# Patient Record
Sex: MALE | Race: WHITE | Employment: FULL TIME | URBAN - METROPOLITAN AREA
[De-identification: names, ages, dates, MRNs, and addresses within clinical notes are randomized per-mention and may not be internally consistent; named-entity substitution may affect disease eponyms.]

---

## 2023-10-04 ENCOUNTER — OFFICE VISIT (OUTPATIENT)
Dept: PRIMARY CARE | Facility: CLINIC | Age: 43
End: 2023-10-04
Payer: COMMERCIAL

## 2023-10-04 VITALS
DIASTOLIC BLOOD PRESSURE: 79 MMHG | HEIGHT: 68 IN | HEART RATE: 68 BPM | BODY MASS INDEX: 34.71 KG/M2 | TEMPERATURE: 98 F | SYSTOLIC BLOOD PRESSURE: 119 MMHG | WEIGHT: 229 LBS

## 2023-10-04 DIAGNOSIS — M75.21 BICEPS TENDONITIS ON RIGHT: ICD-10-CM

## 2023-10-04 DIAGNOSIS — M25.511 ACUTE PAIN OF RIGHT SHOULDER: Primary | ICD-10-CM

## 2023-10-04 DIAGNOSIS — S46.001S ROTATOR CUFF INJURY, RIGHT, SEQUELA: ICD-10-CM

## 2023-10-04 PROCEDURE — 99213 OFFICE O/P EST LOW 20 MIN: CPT | Performed by: FAMILY MEDICINE

## 2023-10-04 RX ORDER — PREDNISONE 20 MG/1
TABLET ORAL
Qty: 18 TABLET | Refills: 0 | Status: SHIPPED | OUTPATIENT
Start: 2023-10-04 | End: 2023-11-27 | Stop reason: WASHOUT

## 2023-10-04 RX ORDER — TIZANIDINE 2 MG/1
2 TABLET ORAL ONCE
Status: DISCONTINUED | OUTPATIENT
Start: 2023-10-04 | End: 2023-11-27

## 2023-10-04 RX ORDER — LEVOTHYROXINE SODIUM 150 UG/1
TABLET ORAL
COMMUNITY
End: 2024-03-08 | Stop reason: SDUPTHER

## 2023-10-04 ASSESSMENT — PAIN SCALES - GENERAL: PAINLEVEL: 4

## 2023-10-04 ASSESSMENT — PATIENT HEALTH QUESTIONNAIRE - PHQ9
1. LITTLE INTEREST OR PLEASURE IN DOING THINGS: NOT AT ALL
SUM OF ALL RESPONSES TO PHQ9 QUESTIONS 1 AND 2: 0
2. FEELING DOWN, DEPRESSED OR HOPELESS: NOT AT ALL

## 2023-10-04 NOTE — PROGRESS NOTES
Patient is here 2 months of worsening shoulder discomfort and pain.  Patient states he has had no injury and many years ago he did have a similar issue with his shoulder and went to physical therapy which was helpful.  He is right-handed and has not had any physical trauma or injury to the shoulder.  He has been waking up and feeling like it is getting frozen and it takes a little bit while for the shoulder mobility to increase especially in abduction and abduction patient has been taking Aleve but feeling like its not working is good.  There is no neurological symptoms or signs.  No weakness in his hand or  strength no injury to his neck or neck pain.    REVIEW OF SYSTEMS: 12 systems negative except for those mentioned in the HPI.    PHYSICAL EXAMINATION:   Constitutional: The patient is alert, in no acute  distress.  Eyes: Extraocular movements are intact.   ENT: external ear canals patent  Neck: no  JVD.  Heart: no JVD  Lungs: Normal respiration without stridor or nasal flaring   Psychiatric: Good judgment and insight. Normal affect and mood.  Skin: no rashes or lesions  .MSK/neurologic: Cranials 2-12 grossly intact.  5/5 motor upper extremities  Full range of motion of the right shoulder.  Negative empty can sign.  Negative Neer and Apley scratch.  Patient does have discomfort in the biceps tendon, supraspinatus insertion, and AC joint.      Assessment:  per EMR    Plan: Discussed with the patient oral medication first steroid muscle relaxers as well as physical therapy.  Also discussed range of motion patient is not having sleep disturbance at this time and will defer injection until possibly follow-up.  He had no trauma and no other physical necessity for an x-ray at this time but if he does follow-up I would consider injections x-ray at that time for possible spurring.  No other red flags or neurologic signs.  Patient is and agrees we will follow-up in 3 weeks      This dictation was created using Dragon  dictation and may contain errors

## 2023-10-24 PROBLEM — Z86.39 HISTORY OF GRAVES' DISEASE: Status: ACTIVE | Noted: 2022-04-14

## 2023-10-24 PROBLEM — E05.00 GRAVES DISEASE: Status: ACTIVE | Noted: 2023-10-24

## 2023-10-24 PROBLEM — D69.6 THROMBOCYTOPENIA (CMS-HCC): Status: ACTIVE | Noted: 2023-10-24

## 2023-10-24 PROBLEM — E66.9 OBESITY, CLASS II, BMI 35-39.9: Status: ACTIVE | Noted: 2021-10-14

## 2023-10-24 PROBLEM — R79.89 INCREASED THYROID STIMULATING HORMONE LEVEL: Status: ACTIVE | Noted: 2023-10-24

## 2023-10-24 PROBLEM — K21.9 GERD (GASTROESOPHAGEAL REFLUX DISEASE): Status: ACTIVE | Noted: 2023-10-24

## 2023-10-24 PROBLEM — E66.812 OBESITY, CLASS II, BMI 35-39.9: Status: ACTIVE | Noted: 2021-10-14

## 2023-10-24 PROBLEM — E78.5 HYPERLIPIDEMIA: Status: ACTIVE | Noted: 2023-10-24

## 2023-10-24 PROBLEM — E03.9 HYPOTHYROIDISM: Status: ACTIVE | Noted: 2020-03-30

## 2023-10-24 PROBLEM — E66.9 OBESITY (BMI 30-39.9): Status: ACTIVE | Noted: 2023-10-24

## 2023-10-24 PROBLEM — G47.30 SLEEP APNEA: Status: ACTIVE | Noted: 2023-10-24

## 2023-10-24 PROBLEM — E55.9 VITAMIN D DEFICIENCY: Status: ACTIVE | Noted: 2023-10-24

## 2023-10-24 PROBLEM — L30.9 ECZEMA: Status: ACTIVE | Noted: 2023-10-24

## 2023-10-24 RX ORDER — VIT C/E/ZN/COPPR/LUTEIN/ZEAXAN 250MG-90MG
25 CAPSULE ORAL DAILY
COMMUNITY

## 2023-10-26 ENCOUNTER — APPOINTMENT (OUTPATIENT)
Dept: PRIMARY CARE | Facility: CLINIC | Age: 43
End: 2023-10-26
Payer: COMMERCIAL

## 2023-11-27 ENCOUNTER — OFFICE VISIT (OUTPATIENT)
Dept: PRIMARY CARE | Facility: CLINIC | Age: 43
End: 2023-11-27
Payer: COMMERCIAL

## 2023-11-27 VITALS
TEMPERATURE: 98 F | DIASTOLIC BLOOD PRESSURE: 78 MMHG | WEIGHT: 232 LBS | HEART RATE: 75 BPM | HEIGHT: 68 IN | OXYGEN SATURATION: 99 % | SYSTOLIC BLOOD PRESSURE: 110 MMHG | BODY MASS INDEX: 35.16 KG/M2

## 2023-11-27 DIAGNOSIS — E66.9 OBESITY (BMI 30-39.9): ICD-10-CM

## 2023-11-27 DIAGNOSIS — S33.5XXA LUMBAR SPRAIN, INITIAL ENCOUNTER: Primary | ICD-10-CM

## 2023-11-27 DIAGNOSIS — Z71.3 WEIGHT LOSS COUNSELING, ENCOUNTER FOR: ICD-10-CM

## 2023-11-27 DIAGNOSIS — E03.9 ACQUIRED HYPOTHYROIDISM: ICD-10-CM

## 2023-11-27 PROCEDURE — 99214 OFFICE O/P EST MOD 30 MIN: CPT | Performed by: FAMILY MEDICINE

## 2023-11-27 PROCEDURE — G0447 BEHAVIOR COUNSEL OBESITY 15M: HCPCS | Performed by: FAMILY MEDICINE

## 2023-11-27 RX ORDER — PHENTERMINE HYDROCHLORIDE 37.5 MG/1
37.5 TABLET ORAL
Qty: 14 TABLET | Refills: 0 | Status: SHIPPED | OUTPATIENT
Start: 2023-11-27 | End: 2023-12-11 | Stop reason: SDUPTHER

## 2023-11-27 RX ORDER — NAPROXEN 250 MG/1
250 TABLET ORAL
COMMUNITY

## 2023-11-27 RX ORDER — TIZANIDINE 2 MG/1
2 TABLET ORAL EVERY 6 HOURS PRN
Qty: 90 TABLET | Refills: 0 | Status: SHIPPED | OUTPATIENT
Start: 2023-11-27 | End: 2023-12-11 | Stop reason: WASHOUT

## 2023-11-27 RX ORDER — MELOXICAM 15 MG/1
15 TABLET ORAL DAILY
Qty: 30 TABLET | Refills: 2 | Status: SHIPPED | OUTPATIENT
Start: 2023-11-27 | End: 2023-12-11 | Stop reason: WASHOUT

## 2023-11-27 ASSESSMENT — PATIENT HEALTH QUESTIONNAIRE - PHQ9
2. FEELING DOWN, DEPRESSED OR HOPELESS: NOT AT ALL
SUM OF ALL RESPONSES TO PHQ9 QUESTIONS 1 AND 2: 0
1. LITTLE INTEREST OR PLEASURE IN DOING THINGS: NOT AT ALL

## 2023-11-27 ASSESSMENT — PAIN SCALES - GENERAL: PAINLEVEL: 2

## 2023-11-27 NOTE — PROGRESS NOTES
Patient is here 3 follow-up.  Is completely resolved with the shoulder pain however now he is having some low back discomfort.  He does work mainly especially on the laptop and is found himself bending forward a lot.  When he does bend forward he does have the discomfort.  He also wakes up every morning with aches and pains and has been taking Aleve.  Once he gets up and moves around and he does that he does actually feel much better at that point but he does not want to keep taking medications every day to facilitate that.  No radiation into his legs or feet.  It is localized to the right lower back.    REVIEW OF SYSTEMS: 12 systems negative except for those mentioned in the HPI.    PHYSICAL EXAMINATION:   Constitutional: The patient is alert, in no acute  distress.  Eyes: Extraocular movements are intact.   ENT: external ear canals patent  Neck: no  JVD.  Heart: no JVD  Lungs: Normal respiration without stridor or nasal flaring   Psychiatric: Good judgment and insight. Normal affect and mood.  Skin: no rashes or lesions  MSK/neurologic: Cranial nerves II through XII grossly intact.  2/4 DTRs ankle and knee.  Negative straight leg raise bilaterally.  Sensation grossly intact.  Point tenderness to the insertion of the right quadratus lumborum.  Poor anterior posturing of the spine as well.      Assessment:  per EMR    Plan:  Discussed home stretching and strengthening exercises with the patient.  Will switch over to daily anti-inflammatory muscle relaxers.  Also discussed switching over to a phone bed.  OARRS reviewed appropriate.  Patient is also interested in medical weight loss has had a hard time getting his thyroid under control.  He does do 5000 steps almost daily and does track this.  He does not know how many calories and 1 pound of fat.  I discussed 1800 tin when he is currently doing 7 pounds per calendar month.  Patient be started on Adipex OARRS is reviewed.  Patient will follow-up in 2 weeks for this.   Also discussed the home exercises and strengthening medications for the back.  Answered all questions shoulder has resolved    This dictation was created using Dragon dictation and may contain errors

## 2023-12-11 ENCOUNTER — OFFICE VISIT (OUTPATIENT)
Dept: PRIMARY CARE | Facility: CLINIC | Age: 43
End: 2023-12-11
Payer: COMMERCIAL

## 2023-12-11 VITALS
OXYGEN SATURATION: 96 % | SYSTOLIC BLOOD PRESSURE: 100 MMHG | WEIGHT: 224 LBS | HEART RATE: 73 BPM | HEIGHT: 68 IN | DIASTOLIC BLOOD PRESSURE: 62 MMHG | TEMPERATURE: 98 F | BODY MASS INDEX: 33.95 KG/M2

## 2023-12-11 DIAGNOSIS — Z71.3 WEIGHT LOSS COUNSELING, ENCOUNTER FOR: Primary | ICD-10-CM

## 2023-12-11 DIAGNOSIS — E66.9 OBESITY (BMI 30-39.9): ICD-10-CM

## 2023-12-11 DIAGNOSIS — S33.5XXD LUMBAR SPRAIN, SUBSEQUENT ENCOUNTER: ICD-10-CM

## 2023-12-11 PROBLEM — E66.812 OBESITY, CLASS II, BMI 35-39.9: Status: RESOLVED | Noted: 2021-10-14 | Resolved: 2023-12-11

## 2023-12-11 PROCEDURE — 99213 OFFICE O/P EST LOW 20 MIN: CPT | Performed by: FAMILY MEDICINE

## 2023-12-11 RX ORDER — PREDNISONE 50 MG/1
50 TABLET ORAL DAILY
Qty: 5 TABLET | Refills: 0 | Status: SHIPPED | OUTPATIENT
Start: 2023-12-11 | End: 2023-12-16

## 2023-12-11 RX ORDER — PHENTERMINE HYDROCHLORIDE 37.5 MG/1
37.5 TABLET ORAL
Qty: 30 TABLET | Refills: 0 | Status: SHIPPED | OUTPATIENT
Start: 2023-12-11 | End: 2024-01-12 | Stop reason: SDUPTHER

## 2023-12-11 ASSESSMENT — PAIN SCALES - GENERAL: PAINLEVEL: 1

## 2023-12-11 NOTE — PROGRESS NOTES
Patient is here to follow-up of medical weight loss as well as also low back pain.  He has made some ergonomic changes at work and that is definitely alleviated the discomfort during the day however he still wakes up stiff.  Once he gets up and starts moving around it is not as bad the muscle laxer's on the medication is not really done much for that.  He did get a Tempur-Pedic mattress Topper to see if that would help.    REVIEW OF SYSTEMS: 12 systems negative except for those mentioned in the HPI.    PHYSICAL EXAMINATION:   Constitutional: The patient is alert, in no acute  distress.  Eyes: Extraocular movements are intact.   ENT: external ear canals patent  Neck: no  JVD.  Heart: no JVD  Lungs: Normal respiration without stridor or nasal flaring   Psychiatric: Good judgment and insight. Normal affect and mood.  Skin: no rashes or lesions    Assessment:  per EMR    Plan:  OARRS reviewed and appropriate.  Patient is done with medical weight loss and is down 8 pounds.  Will continue with.  I discussed he may actually need a much firmer mattress and this is likely the issue waking up in the morning and is otherwise he should be waking up pain-free especially if he has made ergonomic changes.  I offered the patient go to physical therapy patient declines will make some further adjustments to see if he needs to get from her bed as well and will follow-up in 1 month    This dictation was created using Dragon dictation and may contain errors

## 2024-01-11 RX ORDER — MELOXICAM 7.5 MG/1
7.5 TABLET ORAL DAILY
COMMUNITY
Start: 2023-12-29 | End: 2024-01-12 | Stop reason: WASHOUT

## 2024-01-12 ENCOUNTER — OFFICE VISIT (OUTPATIENT)
Dept: PRIMARY CARE | Facility: CLINIC | Age: 44
End: 2024-01-12
Payer: COMMERCIAL

## 2024-01-12 VITALS
OXYGEN SATURATION: 99 % | BODY MASS INDEX: 32.74 KG/M2 | HEIGHT: 68 IN | WEIGHT: 216 LBS | TEMPERATURE: 98 F | DIASTOLIC BLOOD PRESSURE: 66 MMHG | SYSTOLIC BLOOD PRESSURE: 122 MMHG | HEART RATE: 72 BPM
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
## 2024-01-12 DIAGNOSIS — Z71.3 WEIGHT LOSS COUNSELING, ENCOUNTER FOR: Primary | ICD-10-CM

## 2024-01-12 DIAGNOSIS — E03.9 ACQUIRED HYPOTHYROIDISM: ICD-10-CM

## 2024-01-12 DIAGNOSIS — E66.9 OBESITY (BMI 30-39.9): ICD-10-CM
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
## 2024-01-12 PROCEDURE — 99214 OFFICE O/P EST MOD 30 MIN: CPT | Performed by: FAMILY MEDICINE

## 2024-01-12 RX ORDER — PHENTERMINE HYDROCHLORIDE 37.5 MG/1
37.5 TABLET ORAL
Qty: 30 TABLET | Refills: 0 | Status: SHIPPED | OUTPATIENT
Start: 2024-01-12 | End: 2024-02-09 | Stop reason: SDUPTHER

## 2024-01-12 RX ORDER — DICLOFENAC SODIUM 75 MG/1
75 TABLET, DELAYED RELEASE ORAL 2 TIMES DAILY PRN
Qty: 60 TABLET | Refills: 0 | Status: SHIPPED | OUTPATIENT
Start: 2024-01-12 | End: 2024-03-12

## 2024-01-12 ASSESSMENT — PATIENT HEALTH QUESTIONNAIRE - PHQ9
SUM OF ALL RESPONSES TO PHQ9 QUESTIONS 1 AND 2: 0
2. FEELING DOWN, DEPRESSED OR HOPELESS: NOT AT ALL
1. LITTLE INTEREST OR PLEASURE IN DOING THINGS: NOT AT ALL

## 2024-01-12 NOTE — PROGRESS NOTES
Patient is here for follow-up of medical weight loss.  His mastiff cane Shanice dog bit his hand and I had to go to the ER was placed on amoxicillin.  He is having some discomfort and pain in the hand.  I said no redness but she is simply sore from the actual bite itself.  He is doing excellent with the weight loss.    REVIEW OF SYSTEMS: 12 systems negative except for those mentioned in the HPI.    PHYSICAL EXAMINATION:   Constitutional: The patient is alert, in no acute  distress.  Eyes: Extraocular movements are intact.   ENT: external ear canals patent  Neck: no  JVD.  Heart: no JVD  Lungs: Normal respiration without stridor or nasal flaring   Psychiatric: Good judgment and insight. Normal affect and mood.  Skin: Healed puncture wounds on both the dorsum and palmar aspect of the right hand over the first second metacarpal area with well-healed no erythema slightly edematous    Assessment:  per EMR    Plan:  OARRS reviewed appropriate.  Patient is done excellent continue on medical weight loss.  Also add diclofenac to the hand.  Still amoxicillin no signs of infection    This dictation was created using Dragon dictation and may contain errors

## 2024-02-08 PROBLEM — E66.9 OBESITY: Status: ACTIVE | Noted: 2024-02-08

## 2024-02-08 PROBLEM — M25.511 PAIN OF RIGHT SHOULDER REGION: Status: ACTIVE | Noted: 2023-10-04

## 2024-02-08 PROBLEM — E66.9 OBESITY WITH BODY MASS INDEX 30 OR GREATER: Status: ACTIVE | Noted: 2023-10-24

## 2024-02-08 PROBLEM — D69.6 LOW PLATELET COUNT (CMS-HCC): Status: ACTIVE | Noted: 2023-10-24

## 2024-02-08 PROBLEM — R79.89 HIGH THYROID HORMONE LEVEL: Status: ACTIVE | Noted: 2023-10-24

## 2024-02-08 PROBLEM — M75.20 BICEPS TENDINITIS: Status: ACTIVE | Noted: 2023-10-04

## 2024-02-08 PROBLEM — S46.001A INJURY OF RIGHT ROTATOR CUFF: Status: ACTIVE | Noted: 2023-10-04

## 2024-02-08 PROBLEM — E05.00 GRAVES' DISEASE: Status: ACTIVE | Noted: 2020-03-30

## 2024-02-08 PROBLEM — K21.9 GASTROESOPHAGEAL REFLUX DISEASE: Status: ACTIVE | Noted: 2023-10-24

## 2024-02-09 ENCOUNTER — OFFICE VISIT (OUTPATIENT)
Dept: PRIMARY CARE | Facility: CLINIC | Age: 44
End: 2024-02-09
Payer: COMMERCIAL

## 2024-02-09 VITALS
OXYGEN SATURATION: 97 % | TEMPERATURE: 98 F | HEIGHT: 68 IN | SYSTOLIC BLOOD PRESSURE: 132 MMHG | DIASTOLIC BLOOD PRESSURE: 90 MMHG | WEIGHT: 215 LBS | HEART RATE: 71 BPM | BODY MASS INDEX: 32.58 KG/M2

## 2024-02-09 DIAGNOSIS — E66.9 OBESITY (BMI 30-39.9): ICD-10-CM

## 2024-02-09 DIAGNOSIS — S33.5XXD LUMBAR SPRAIN, SUBSEQUENT ENCOUNTER: ICD-10-CM

## 2024-02-09 DIAGNOSIS — Z71.3 WEIGHT LOSS COUNSELING, ENCOUNTER FOR: Primary | ICD-10-CM

## 2024-02-09 PROCEDURE — 97110 THERAPEUTIC EXERCISES: CPT | Performed by: FAMILY MEDICINE

## 2024-02-09 PROCEDURE — 99214 OFFICE O/P EST MOD 30 MIN: CPT | Performed by: FAMILY MEDICINE

## 2024-02-09 RX ORDER — PHENTERMINE HYDROCHLORIDE 37.5 MG/1
37.5 TABLET ORAL
Qty: 30 TABLET | Refills: 0 | Status: SHIPPED | OUTPATIENT
Start: 2024-02-09 | End: 2024-03-08 | Stop reason: SDUPTHER

## 2024-02-09 RX ORDER — METHOCARBAMOL 500 MG/1
500 TABLET, FILM COATED ORAL 4 TIMES DAILY PRN
Qty: 90 TABLET | Refills: 0 | Status: SHIPPED | OUTPATIENT
Start: 2024-02-09 | End: 2024-04-09

## 2024-02-09 NOTE — PROGRESS NOTES
Patient is here 1 month follow-up of medical weight loss.  Is done well overall and is his general 5% jaspreet but is only down 1 pound this month.  The bite to his hand is completely resolved.  Still having some back spasms.  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  No radiation no loss of bowel or bladder function or loss of function.    REVIEW OF SYSTEMS: 12 systems negative except for those mentioned in the HPI.    PHYSICAL EXAMINATION:   Constitutional: The patient is alert, in no acute  distress.  Eyes: Extraocular movements are intact.   ENT: external ear canals patent  Neck: no  JVD.  Heart: no JVD  Lungs: Normal respiration without stridor or nasal flaring   Psychiatric: Good judgment and insight. Normal affect and mood.  Skin: no rashes or lesions  MSK/neurologic: Cranials 2-12 is intact.  Negative straight leg raise bilaterally.  2/4 DTRs ankle and knee.      Assessment:  per EMR    Plan:  OARRS reviewed actually does not show up in OARRS which is in usual we will continue with the Adipex.  Also give muscle laxer for the back.  Musculoskeletal and also printed off the [place] back method.  Should the patient in the office had to do these and did actually have good relief with doing some the exercises here in the office today.  If not improving would also consider back x-ray will follow-up in 1 month needs to lose 6 pounds    This dictation was created using Dragon dictation and may contain errors

## 2024-02-29 ENCOUNTER — OFFICE VISIT (OUTPATIENT)
Dept: UROLOGY | Facility: HOSPITAL | Age: 44
End: 2024-02-29
Payer: COMMERCIAL

## 2024-02-29 ENCOUNTER — APPOINTMENT (OUTPATIENT)
Dept: UROLOGY | Facility: CLINIC | Age: 44
End: 2024-02-29
Payer: COMMERCIAL

## 2024-02-29 DIAGNOSIS — N20.0 KIDNEY STONES: ICD-10-CM

## 2024-02-29 DIAGNOSIS — N20.1 RIGHT URETERAL STONE: ICD-10-CM

## 2024-02-29 DIAGNOSIS — N20.1 RIGHT URETERAL STONE: Primary | ICD-10-CM

## 2024-02-29 PROCEDURE — 82365 CALCULUS SPECTROSCOPY: CPT | Performed by: UROLOGY

## 2024-02-29 PROCEDURE — 99214 OFFICE O/P EST MOD 30 MIN: CPT | Performed by: UROLOGY

## 2024-02-29 PROCEDURE — 99204 OFFICE O/P NEW MOD 45 MIN: CPT | Performed by: UROLOGY

## 2024-02-29 NOTE — PROGRESS NOTES
HPI:    Kedar Moore is a 43 y.o. male referred by ?ED or Dr. Manjit Torres for ?. Hx of GERD, HLD, sleep apnea.     Review of Systems:  All systems reviewed. Anything negative noted in the HPI.    Physical Exam:  Vitals signs reviewed.  Constitutional:      Appearance: Well-developed.  HENT:     Head: Normocephalic and atraumatic.  Neck:     Musculoskeletal: Normal range of motion.  Pulmonary:     Effort: Pulmonary effort is normal.  Musculoskeletal: Normal range of motion.  Skin:     General: Skin is warm and dry.  Neurological:     Mental Status: Alert and oriented to person, place, and time.  Psychiatric:        Behavior: Behavior normal.        Thought Content: Thought content normal.        Judgment: Judgment normal.    Procedures:    Assessment/Plan   Kedar Moore is a 43 y.o. male referred by ?ED or Dr. Manjit Torres for ?. Hx of GERD, HLD, sleep apnea.             By signing my name below, I, Radha Hansen, attest that this documentation has been prepared under the direction and in the presence of Dr. Villa George.  All medical record entries made by the Scribe were at my direction and personally dictated by me. I have reviewed the chart and agree that the record accurately reflects my personal performance of the history, physical exam, discussion and plan.

## 2024-02-29 NOTE — PROGRESS NOTES
"HPI    43-year-old is seen for kidney stones.    Was in ED 2/27/24 with R flank pain. CT showed a 1.2mm R UVJ stone. Bladder empty. Mild R hydro. Small nonobstructing R kidney stone.     He passed a stone yesterday.  Brings in for analysis.  Previous stone past year prior was calcium oxalate.  His for stone was 1 year ago , he passed spontaneously.    No results found for: \"PSA\"      Current Medications:  Current Outpatient Medications   Medication Sig Dispense Refill    cholecalciferol (Vitamin D3) 25 MCG (1000 UT) capsule Take 1 capsule (25 mcg) by mouth once daily.      diclofenac (Voltaren) 75 mg EC tablet Take 1 tablet (75 mg) by mouth 2 times a day as needed (pain). Do not crush, chew, or split. 60 tablet 0    methocarbamol (Robaxin) 500 mg tablet Take 1 tablet (500 mg) by mouth 4 times a day as needed for muscle spasms. 90 tablet 0    naproxen (Naprosyn) 250 mg tablet Take 1 tablet (250 mg) by mouth 2 times a day with meals. PRN      phentermine (Adipex-P) 37.5 mg tablet Take 1 tablet (37.5 mg) by mouth once daily in the morning. Take before meals. 30 tablet 0    Synthroid 150 mcg tablet take one-full tablet from monday thru saturday and take one and one-half tablets on sunday.       No current facility-administered medications for this visit.        Active Problems:  Kedar Moore is a 43 y.o. male with the following Problems and Medications.  Patient Active Problem List   Diagnosis    Acute pain of right shoulder    Rotator cuff injury, right, sequela    Biceps tendonitis on right    Eczema    GERD (gastroesophageal reflux disease)    Graves disease    History of Graves' disease    Hyperlipidemia    Hypothyroidism    Increased thyroid stimulating hormone level    Obesity (BMI 30-39.9)    Sleep apnea    Thrombocytopenia (CMS/HCC)    Vitamin D deficiency    Lumbar sprain    Weight loss counseling, encounter for    Dog bite    Pain of right shoulder region    Biceps tendinitis    Gastroesophageal reflux " disease    Graves' disease    High thyroid hormone level    Injury of right rotator cuff    Low platelet count (CMS/HCC)     Current Outpatient Medications   Medication Sig Dispense Refill    cholecalciferol (Vitamin D3) 25 MCG (1000 UT) capsule Take 1 capsule (25 mcg) by mouth once daily.      diclofenac (Voltaren) 75 mg EC tablet Take 1 tablet (75 mg) by mouth 2 times a day as needed (pain). Do not crush, chew, or split. 60 tablet 0    methocarbamol (Robaxin) 500 mg tablet Take 1 tablet (500 mg) by mouth 4 times a day as needed for muscle spasms. 90 tablet 0    naproxen (Naprosyn) 250 mg tablet Take 1 tablet (250 mg) by mouth 2 times a day with meals. PRN      phentermine (Adipex-P) 37.5 mg tablet Take 1 tablet (37.5 mg) by mouth once daily in the morning. Take before meals. 30 tablet 0    Synthroid 150 mcg tablet take one-full tablet from monday thru saturday and take one and one-half tablets on sunday.       No current facility-administered medications for this visit.       PMH:  No past medical history on file.    PSH:  Past Surgical History:   Procedure Laterality Date    OTHER SURGICAL HISTORY  05/18/2022    Sinus surgery    OTHER SURGICAL HISTORY  05/18/2022    Complete colonoscopy       FMH:  Family History   Problem Relation Name Age of Onset    Thyroid disease Mother      Diverticulosis Father      Heart block Father         SHx:  Social History     Tobacco Use    Smoking status: Never    Smokeless tobacco: Current     Types: Chew   Vaping Use    Vaping Use: Never used   Substance Use Topics    Alcohol use: Not Currently    Drug use: Never       Allergies:  Allergies   Allergen Reactions    Pollen Extracts Unknown         Assessment/Plan  Recurrent kidney stones, now having passed a small right ureteral stone.  Will send for stone analysis.  We discussed metabolic and surgical management of stones.  He has a small stone in his right kidney, would not recommend treatment at this time.  We discussed  metabolic management.  He is keen to find out why he is forming stones.  Will obtain a metabolic urine test and see back in 3 months to review the results.        Scribe Attestation  By signing my name below, I, Radha Hernández, attest that this documentation  has been prepared under the direction and in the presence of Jordy Yo MD.

## 2024-03-05 LAB
APPEARANCE STONE: NORMAL
COMPN STONE: NORMAL
SPECIMEN WT: 5 MG

## 2024-03-08 ENCOUNTER — OFFICE VISIT (OUTPATIENT)
Dept: PRIMARY CARE | Facility: CLINIC | Age: 44
End: 2024-03-08
Payer: COMMERCIAL

## 2024-03-08 VITALS
BODY MASS INDEX: 31.83 KG/M2 | WEIGHT: 210 LBS | TEMPERATURE: 98.2 F | HEART RATE: 84 BPM | OXYGEN SATURATION: 97 % | SYSTOLIC BLOOD PRESSURE: 121 MMHG | HEIGHT: 68 IN | DIASTOLIC BLOOD PRESSURE: 87 MMHG

## 2024-03-08 DIAGNOSIS — N20.0 CALCIUM KIDNEY STONE: ICD-10-CM

## 2024-03-08 DIAGNOSIS — E55.9 VITAMIN D DEFICIENCY: ICD-10-CM

## 2024-03-08 DIAGNOSIS — E78.2 MIXED HYPERLIPIDEMIA: ICD-10-CM

## 2024-03-08 DIAGNOSIS — Z00.00 WELLNESS EXAMINATION: ICD-10-CM

## 2024-03-08 DIAGNOSIS — E66.9 OBESITY (BMI 30-39.9): ICD-10-CM

## 2024-03-08 DIAGNOSIS — E03.9 ACQUIRED HYPOTHYROIDISM: ICD-10-CM

## 2024-03-08 DIAGNOSIS — Z71.3 WEIGHT LOSS COUNSELING, ENCOUNTER FOR: Primary | ICD-10-CM

## 2024-03-08 PROCEDURE — 99214 OFFICE O/P EST MOD 30 MIN: CPT | Performed by: FAMILY MEDICINE

## 2024-03-08 RX ORDER — PHENTERMINE HYDROCHLORIDE 37.5 MG/1
37.5 TABLET ORAL
Qty: 30 TABLET | Refills: 0 | Status: SHIPPED | OUTPATIENT
Start: 2024-03-08 | End: 2024-04-04 | Stop reason: SDUPTHER

## 2024-03-08 RX ORDER — LEVOTHYROXINE SODIUM 150 UG/1
150 TABLET ORAL
Qty: 90 TABLET | Refills: 1 | Status: SHIPPED | OUTPATIENT
Start: 2024-03-08 | End: 2024-05-06

## 2024-03-08 NOTE — PROGRESS NOTES
Patient is here follow-up medical weight loss as well as a couple other things.  He had a hard time getting into see an endocrinologist to get a refill of his thyroid and also have medication check.  He also passed a kidney stone follow-up with his urologist would like some help with what he should do.    REVIEW OF SYSTEMS: 12 systems negative except for those mentioned in the HPI.    PHYSICAL EXAMINATION:   Constitutional: The patient is alert, in no acute  distress.  Eyes: Extraocular movements are intact.   ENT: external ear canals patent  Neck: no  JVD.  Heart: no JVD  Lungs: Normal respiration without stridor or nasal flaring   Psychiatric: Good judgment and insight. Normal affect and mood.  Skin: no rashes or lesions    Assessment:  per EMR    Plan:  OARRS reviewed appropriate.  Patient is more than 5% x 3 pounds.  He needs to be at 205 or less for next month to continue on the Adipex.  Also reviewed his chart and the prior kidney stones.  Gave him a printout of things to eat and drink and also things to avoid for kidney stones and follow-up with urology.  Will also take over thyroid management.  He also needs for annual blood work CBC CMP A1c lipid panel thyroid refill his medication follow-up in 1 month    This dictation was created using Dragon dictation and may contain errors

## 2024-03-16 ENCOUNTER — LAB (OUTPATIENT)
Dept: LAB | Facility: LAB | Age: 44
End: 2024-03-16
Payer: COMMERCIAL

## 2024-03-16 DIAGNOSIS — E03.9 ACQUIRED HYPOTHYROIDISM: ICD-10-CM

## 2024-03-16 DIAGNOSIS — E78.2 MIXED HYPERLIPIDEMIA: ICD-10-CM

## 2024-03-16 DIAGNOSIS — Z00.00 WELLNESS EXAMINATION: ICD-10-CM

## 2024-03-16 DIAGNOSIS — E55.9 VITAMIN D DEFICIENCY: ICD-10-CM

## 2024-03-16 PROCEDURE — 84443 ASSAY THYROID STIM HORMONE: CPT

## 2024-03-16 PROCEDURE — 36415 COLL VENOUS BLD VENIPUNCTURE: CPT

## 2024-03-16 PROCEDURE — 80061 LIPID PANEL: CPT

## 2024-03-16 PROCEDURE — 85025 COMPLETE CBC W/AUTO DIFF WBC: CPT

## 2024-03-16 PROCEDURE — 82306 VITAMIN D 25 HYDROXY: CPT

## 2024-03-16 PROCEDURE — 83036 HEMOGLOBIN GLYCOSYLATED A1C: CPT

## 2024-03-16 PROCEDURE — 80053 COMPREHEN METABOLIC PANEL: CPT

## 2024-03-17 LAB
25(OH)D3 SERPL-MCNC: 22 NG/ML (ref 30–100)
ALBUMIN SERPL BCP-MCNC: 4.3 G/DL (ref 3.4–5)
ALP SERPL-CCNC: 65 U/L (ref 33–120)
ALT SERPL W P-5'-P-CCNC: 11 U/L (ref 10–52)
ANION GAP SERPL CALC-SCNC: 15 MMOL/L (ref 10–20)
AST SERPL W P-5'-P-CCNC: 11 U/L (ref 9–39)
BASOPHILS # BLD AUTO: 0.05 X10*3/UL (ref 0–0.1)
BASOPHILS NFR BLD AUTO: 0.7 %
BILIRUB SERPL-MCNC: 0.4 MG/DL (ref 0–1.2)
BUN SERPL-MCNC: 16 MG/DL (ref 6–23)
CALCIUM SERPL-MCNC: 9 MG/DL (ref 8.6–10.6)
CHLORIDE SERPL-SCNC: 104 MMOL/L (ref 98–107)
CHOLEST SERPL-MCNC: 163 MG/DL (ref 0–199)
CHOLESTEROL/HDL RATIO: 3.2
CO2 SERPL-SCNC: 26 MMOL/L (ref 21–32)
CREAT SERPL-MCNC: 1.12 MG/DL (ref 0.5–1.3)
EGFRCR SERPLBLD CKD-EPI 2021: 83 ML/MIN/1.73M*2
EOSINOPHIL # BLD AUTO: 0.14 X10*3/UL (ref 0–0.7)
EOSINOPHIL NFR BLD AUTO: 1.9 %
ERYTHROCYTE [DISTWIDTH] IN BLOOD BY AUTOMATED COUNT: 13.1 % (ref 11.5–14.5)
EST. AVERAGE GLUCOSE BLD GHB EST-MCNC: 103 MG/DL
GLUCOSE SERPL-MCNC: 101 MG/DL (ref 74–99)
HBA1C MFR BLD: 5.2 %
HCT VFR BLD AUTO: 41.7 % (ref 41–52)
HDLC SERPL-MCNC: 51.6 MG/DL
HGB BLD-MCNC: 13.2 G/DL (ref 13.5–17.5)
IMM GRANULOCYTES # BLD AUTO: 0.01 X10*3/UL (ref 0–0.7)
IMM GRANULOCYTES NFR BLD AUTO: 0.1 % (ref 0–0.9)
LDLC SERPL CALC-MCNC: 92 MG/DL
LYMPHOCYTES # BLD AUTO: 1.8 X10*3/UL (ref 1.2–4.8)
LYMPHOCYTES NFR BLD AUTO: 24.8 %
MCH RBC QN AUTO: 27.9 PG (ref 26–34)
MCHC RBC AUTO-ENTMCNC: 31.7 G/DL (ref 32–36)
MCV RBC AUTO: 88 FL (ref 80–100)
MONOCYTES # BLD AUTO: 0.5 X10*3/UL (ref 0.1–1)
MONOCYTES NFR BLD AUTO: 6.9 %
NEUTROPHILS # BLD AUTO: 4.75 X10*3/UL (ref 1.2–7.7)
NEUTROPHILS NFR BLD AUTO: 65.6 %
NON HDL CHOLESTEROL: 111 MG/DL (ref 0–149)
NRBC BLD-RTO: 0 /100 WBCS (ref 0–0)
PLATELET # BLD AUTO: 162 X10*3/UL (ref 150–450)
POTASSIUM SERPL-SCNC: 4 MMOL/L (ref 3.5–5.3)
PROT SERPL-MCNC: 7.5 G/DL (ref 6.4–8.2)
RBC # BLD AUTO: 4.73 X10*6/UL (ref 4.5–5.9)
SODIUM SERPL-SCNC: 141 MMOL/L (ref 136–145)
TRIGL SERPL-MCNC: 99 MG/DL (ref 0–149)
TSH SERPL-ACNC: 29.91 MIU/L (ref 0.44–3.98)
VLDL: 20 MG/DL (ref 0–40)
WBC # BLD AUTO: 7.3 X10*3/UL (ref 4.4–11.3)

## 2024-03-18 ENCOUNTER — TELEPHONE (OUTPATIENT)
Dept: PRIMARY CARE | Facility: CLINIC | Age: 44
End: 2024-03-18
Payer: COMMERCIAL

## 2024-03-18 NOTE — TELEPHONE ENCOUNTER
----- Message from Manjit Torres DO sent at 3/18/2024  9:59 AM EDT -----  Patient's blood work shows that his thyroid is extremely low.  He also has a slightly low blood count we will be talking about this at his follow-up appointment.  We have substantial weight loss he might build to recheck the thyroid but if not may need to be placed on some thyroid medication.

## 2024-04-03 PROBLEM — N20.1 CALCULUS OF URETER: Status: ACTIVE | Noted: 2024-03-08

## 2024-04-04 ENCOUNTER — OFFICE VISIT (OUTPATIENT)
Dept: PRIMARY CARE | Facility: CLINIC | Age: 44
End: 2024-04-04
Payer: COMMERCIAL

## 2024-04-04 VITALS
WEIGHT: 205 LBS | HEART RATE: 73 BPM | SYSTOLIC BLOOD PRESSURE: 130 MMHG | OXYGEN SATURATION: 97 % | DIASTOLIC BLOOD PRESSURE: 92 MMHG | TEMPERATURE: 97.9 F | BODY MASS INDEX: 31.07 KG/M2 | HEIGHT: 68 IN

## 2024-04-04 DIAGNOSIS — E66.9 OBESITY (BMI 30-39.9): ICD-10-CM

## 2024-04-04 DIAGNOSIS — Z71.3 WEIGHT LOSS COUNSELING, ENCOUNTER FOR: ICD-10-CM

## 2024-04-04 DIAGNOSIS — E03.9 ACQUIRED HYPOTHYROIDISM: Primary | ICD-10-CM

## 2024-04-04 PROCEDURE — 99214 OFFICE O/P EST MOD 30 MIN: CPT | Performed by: FAMILY MEDICINE

## 2024-04-04 RX ORDER — PHENTERMINE HYDROCHLORIDE 37.5 MG/1
37.5 TABLET ORAL
Qty: 30 TABLET | Refills: 0 | Status: SHIPPED | OUTPATIENT
Start: 2024-04-04 | End: 2024-05-09 | Stop reason: SDUPTHER

## 2024-04-04 ASSESSMENT — PATIENT HEALTH QUESTIONNAIRE - PHQ9
1. LITTLE INTEREST OR PLEASURE IN DOING THINGS: NOT AT ALL
2. FEELING DOWN, DEPRESSED OR HOPELESS: NOT AT ALL
SUM OF ALL RESPONSES TO PHQ9 QUESTIONS 1 AND 2: 0

## 2024-04-04 NOTE — PROGRESS NOTES
Patient is here 1 month follow-up medical weight loss as well as also his thyroid.  He has not been able to do the refill through the endocrinologist and so he gone back on the 150 when I saw him a month ago and had the labs.  It has always been kind of a problem with the grades were would go up and down and sometimes he would almost come off medication completely.    REVIEW OF SYSTEMS: 12 systems negative except for those mentioned in the HPI.    PHYSICAL EXAMINATION:   Constitutional: The patient is alert, in no acute  distress.  Eyes: Extraocular movements are intact.   ENT: external ear canals patent  Neck: no  JVD.  Heart: no JVD  Lungs: Normal respiration without stridor or nasal flaring   Psychiatric: Good judgment and insight. Normal affect and mood.  Skin: no rashes or lesions    Assessment:  per EMR    Plan:  OARRS reviewed appropriate.  Patient is done excellent with the weight.  TSH was extremely low.  Will retest TSH and antithrombin and TPO antibodies    This dictation was created using Dragon dictation and may contain errors

## 2024-05-03 ENCOUNTER — LAB (OUTPATIENT)
Dept: LAB | Facility: LAB | Age: 44
End: 2024-05-03
Payer: COMMERCIAL

## 2024-05-03 DIAGNOSIS — E03.9 ACQUIRED HYPOTHYROIDISM: ICD-10-CM

## 2024-05-03 PROCEDURE — 36415 COLL VENOUS BLD VENIPUNCTURE: CPT

## 2024-05-03 PROCEDURE — 84443 ASSAY THYROID STIM HORMONE: CPT

## 2024-05-03 PROCEDURE — 86376 MICROSOMAL ANTIBODY EACH: CPT

## 2024-05-03 PROCEDURE — 86800 THYROGLOBULIN ANTIBODY: CPT

## 2024-05-03 PROCEDURE — 84439 ASSAY OF FREE THYROXINE: CPT

## 2024-05-04 LAB
T4 FREE SERPL-MCNC: 0.76 NG/DL (ref 0.78–1.48)
THYROPEROXIDASE AB SERPL-ACNC: >1000 IU/ML
TSH SERPL-ACNC: 31.2 MIU/L (ref 0.44–3.98)

## 2024-05-06 DIAGNOSIS — Z00.00 WELLNESS EXAMINATION: ICD-10-CM

## 2024-05-06 DIAGNOSIS — E78.2 MIXED HYPERLIPIDEMIA: ICD-10-CM

## 2024-05-06 DIAGNOSIS — E55.9 VITAMIN D DEFICIENCY: ICD-10-CM

## 2024-05-06 DIAGNOSIS — E03.9 ACQUIRED HYPOTHYROIDISM: ICD-10-CM

## 2024-05-06 RX ORDER — LEVOTHYROXINE SODIUM 200 UG/1
200 TABLET ORAL DAILY
Qty: 30 TABLET | Refills: 11 | Status: SHIPPED | OUTPATIENT
Start: 2024-05-06 | End: 2024-06-10 | Stop reason: WASHOUT

## 2024-05-07 LAB — THYROGLOB AB SERPL-ACNC: <0.9 IU/ML (ref 0–4)

## 2024-05-09 ENCOUNTER — OFFICE VISIT (OUTPATIENT)
Dept: PRIMARY CARE | Facility: CLINIC | Age: 44
End: 2024-05-09
Payer: COMMERCIAL

## 2024-05-09 VITALS
SYSTOLIC BLOOD PRESSURE: 106 MMHG | HEIGHT: 68 IN | HEART RATE: 70 BPM | TEMPERATURE: 97.3 F | BODY MASS INDEX: 30.46 KG/M2 | DIASTOLIC BLOOD PRESSURE: 70 MMHG | WEIGHT: 201 LBS

## 2024-05-09 DIAGNOSIS — E03.9 ACQUIRED HYPOTHYROIDISM: ICD-10-CM

## 2024-05-09 DIAGNOSIS — E05.00 GRAVES DISEASE: Primary | ICD-10-CM

## 2024-05-09 DIAGNOSIS — Z71.3 WEIGHT LOSS COUNSELING, ENCOUNTER FOR: ICD-10-CM

## 2024-05-09 PROCEDURE — 99214 OFFICE O/P EST MOD 30 MIN: CPT | Performed by: FAMILY MEDICINE

## 2024-05-09 RX ORDER — LIOTHYRONINE SODIUM 5 UG/1
5 TABLET ORAL DAILY
Qty: 90 TABLET | Refills: 0 | Status: SHIPPED | OUTPATIENT
Start: 2024-05-09 | End: 2024-08-07

## 2024-05-09 RX ORDER — PHENTERMINE HYDROCHLORIDE 37.5 MG/1
37.5 TABLET ORAL
Qty: 30 TABLET | Refills: 0 | Status: SHIPPED | OUTPATIENT
Start: 2024-05-09 | End: 2024-06-10 | Stop reason: SDUPTHER

## 2024-05-09 ASSESSMENT — PATIENT HEALTH QUESTIONNAIRE - PHQ9
SUM OF ALL RESPONSES TO PHQ9 QUESTIONS 1 AND 2: 0
1. LITTLE INTEREST OR PLEASURE IN DOING THINGS: NOT AT ALL
2. FEELING DOWN, DEPRESSED OR HOPELESS: NOT AT ALL

## 2024-05-09 NOTE — PROGRESS NOTES
Patient is here 1 month follow-up of medical weight loss.  He is done well and has met the 5%.  He also wants to talk about the thyroid is always had issues for years about the thyroid going up and down.  Has been switched between levothyroxine and Synthroid does not necessarily seem to make much of a difference sometimes.    REVIEW OF SYSTEMS: 12 systems negative except for those mentioned in the HPI.    PHYSICAL EXAMINATION:   Constitutional: The patient is alert, in no acute  distress.  Eyes: Extraocular movements are intact.   ENT: external ear canals patent  Neck: no  JVD.  Heart: no JVD  Lungs: Normal respiration without stridor or nasal flaring   Psychiatric: Good judgment and insight. Normal affect and mood.  Skin: no rashes or lesions    Assessment:  per EMR    Plan:  OARRS reviewed appropriate.  Will continue the Adipex he is already met 5% he will need to be down to 199 next month to continue 5% weight loss as well.  With the hypothyroid discussed the pathophysiology of this and his TSH still being extremely high.  It does not sound like he is necessarily been on NP thyroid and his body really simply needs more T3 so we will simply give the additional T3 he has not yet started the 200 mcg of Synthroid either we will go ahead and put in for a TSH to be done before his next visit and consider switching him over to NP thyroid if this is not doing well since it has a higher concentration of active form    This dictation was created using Dragon dictation and may contain errors

## 2024-05-30 ENCOUNTER — TELEMEDICINE (OUTPATIENT)
Dept: UROLOGY | Facility: HOSPITAL | Age: 44
End: 2024-05-30
Payer: COMMERCIAL

## 2024-05-30 DIAGNOSIS — N20.0 CALCIUM KIDNEY STONE: Primary | ICD-10-CM

## 2024-05-30 DIAGNOSIS — R82.991 HYPOCITRATURIA: ICD-10-CM

## 2024-05-30 PROCEDURE — 99214 OFFICE O/P EST MOD 30 MIN: CPT | Performed by: UROLOGY

## 2024-05-30 NOTE — PROGRESS NOTES
HPI    44 y.o. male being seen with the following problem list:    Problem list:  Stones    Was in ED 2/27/24 with R flank pain. CT showed a 1.2mm R UVJ stone. Bladder empty. Mild R hydro. Small nonobstructing R kidney stone.      He passed a stone yesterday.  Brings in for analysis.  Previous stone past year prior was calcium oxalate.  His for stone was 1 year ago , he passed spontaneously.    Stone composition - ca ox    Litholink 3/2024 - notable for low urine volumes, hypocitraturia, pH 6.2, elevated dietary sodium.    05/30/24 - seen back to review LL.    Current Medications:  Current Outpatient Medications   Medication Sig Dispense Refill    cholecalciferol (Vitamin D3) 25 MCG (1000 UT) capsule Take 1 capsule (25 mcg) by mouth once daily.      liothyronine (Cytomel) 5 mcg tablet Take 1 tablet (5 mcg) by mouth once daily. 90 tablet 0    methocarbamol (Robaxin) 500 mg tablet Take 1 tablet (500 mg) by mouth 4 times a day as needed for muscle spasms. 90 tablet 0    naproxen (Naprosyn) 250 mg tablet Take 1 tablet (250 mg) by mouth 2 times a day with meals. PRN      phentermine (Adipex-P) 37.5 mg tablet Take 1 tablet (37.5 mg) by mouth once daily in the morning. Take before meals. 30 tablet 0    Synthroid 200 mcg tablet Take 1 tablet (200 mcg) by mouth once daily. 30 tablet 11     No current facility-administered medications for this visit.        Active Problems:  Kedar Moore is a 44 y.o. male with the following Problems and Medications.  Patient Active Problem List   Diagnosis    Acute pain of right shoulder    Rotator cuff injury, right, sequela    Biceps tendonitis on right    Eczema    GERD (gastroesophageal reflux disease)    Graves disease    History of Graves' disease    Hyperlipidemia    Hypothyroidism    Increased thyroid stimulating hormone level    Obesity (BMI 30-39.9)    Sleep apnea    Thrombocytopenia (CMS-HCC)    Vitamin D deficiency    Lumbar sprain    Weight loss counseling, encounter for     Dog bite    Pain of right shoulder region    Biceps tendinitis    Gastroesophageal reflux disease    Graves' disease    High thyroid hormone level    Injury of right rotator cuff    Low platelet count (CMS-HCC)    Calcium kidney stone    Calculus of ureter     Current Outpatient Medications   Medication Sig Dispense Refill    cholecalciferol (Vitamin D3) 25 MCG (1000 UT) capsule Take 1 capsule (25 mcg) by mouth once daily.      liothyronine (Cytomel) 5 mcg tablet Take 1 tablet (5 mcg) by mouth once daily. 90 tablet 0    methocarbamol (Robaxin) 500 mg tablet Take 1 tablet (500 mg) by mouth 4 times a day as needed for muscle spasms. 90 tablet 0    naproxen (Naprosyn) 250 mg tablet Take 1 tablet (250 mg) by mouth 2 times a day with meals. PRN      phentermine (Adipex-P) 37.5 mg tablet Take 1 tablet (37.5 mg) by mouth once daily in the morning. Take before meals. 30 tablet 0    Synthroid 200 mcg tablet Take 1 tablet (200 mcg) by mouth once daily. 30 tablet 11     No current facility-administered medications for this visit.       PMH:  No past medical history on file.    PSH:  Past Surgical History:   Procedure Laterality Date    OTHER SURGICAL HISTORY  05/18/2022    Sinus surgery    OTHER SURGICAL HISTORY  05/18/2022    Complete colonoscopy       FMH:  Family History   Problem Relation Name Age of Onset    Thyroid disease Mother      Diverticulosis Father      Heart block Father         SHx:  Social History     Tobacco Use    Smoking status: Never    Smokeless tobacco: Current     Types: Chew   Vaping Use    Vaping status: Never Used   Substance Use Topics    Alcohol use: Not Currently    Drug use: Never       Allergies:  Allergies   Allergen Reactions    Pollen Extracts Unknown     Assessment/Plan    Recommendations:  Increase fluids, goal >2.5L UOP/day  Litholyte 1 packet 2x a day to increase citrate  Reduce dietary sodium  Fu 6 mo over THV with RBUS prior      Scribe Attestation  By signing my name below, Maribeth SMITH  Radha Hernandez, attest that this documentation  has been prepared under the direction and in the presence of Jordy Yo MD.

## 2024-06-05 ENCOUNTER — LAB (OUTPATIENT)
Dept: LAB | Facility: LAB | Age: 44
End: 2024-06-05
Payer: COMMERCIAL

## 2024-06-05 DIAGNOSIS — E05.00 GRAVES DISEASE: ICD-10-CM

## 2024-06-05 DIAGNOSIS — E03.9 ACQUIRED HYPOTHYROIDISM: ICD-10-CM

## 2024-06-05 LAB — TSH SERPL-ACNC: 0.09 MIU/L (ref 0.44–3.98)

## 2024-06-05 PROCEDURE — 84443 ASSAY THYROID STIM HORMONE: CPT

## 2024-06-05 PROCEDURE — 36415 COLL VENOUS BLD VENIPUNCTURE: CPT

## 2024-06-10 ENCOUNTER — OFFICE VISIT (OUTPATIENT)
Dept: PRIMARY CARE | Facility: CLINIC | Age: 44
End: 2024-06-10
Payer: COMMERCIAL

## 2024-06-10 VITALS
TEMPERATURE: 97.9 F | BODY MASS INDEX: 30.01 KG/M2 | HEART RATE: 65 BPM | WEIGHT: 198 LBS | DIASTOLIC BLOOD PRESSURE: 70 MMHG | SYSTOLIC BLOOD PRESSURE: 118 MMHG | HEIGHT: 68 IN

## 2024-06-10 DIAGNOSIS — E03.9 ACQUIRED HYPOTHYROIDISM: ICD-10-CM

## 2024-06-10 DIAGNOSIS — Z71.3 WEIGHT LOSS COUNSELING, ENCOUNTER FOR: Primary | ICD-10-CM

## 2024-06-10 DIAGNOSIS — E78.2 MIXED HYPERLIPIDEMIA: ICD-10-CM

## 2024-06-10 DIAGNOSIS — R79.89 INCREASED THYROID STIMULATING HORMONE LEVEL: ICD-10-CM

## 2024-06-10 DIAGNOSIS — E66.9 OBESITY (BMI 30-39.9): ICD-10-CM

## 2024-06-10 DIAGNOSIS — M23.8X1 MCL DEFICIENCY, KNEE, RIGHT: ICD-10-CM

## 2024-06-10 PROCEDURE — 99214 OFFICE O/P EST MOD 30 MIN: CPT | Performed by: FAMILY MEDICINE

## 2024-06-10 RX ORDER — MELOXICAM 15 MG/1
15 TABLET ORAL DAILY
Qty: 30 TABLET | Refills: 0 | Status: SHIPPED | OUTPATIENT
Start: 2024-06-10 | End: 2024-07-10

## 2024-06-10 RX ORDER — PHENTERMINE HYDROCHLORIDE 37.5 MG/1
37.5 TABLET ORAL
Qty: 30 TABLET | Refills: 0 | Status: SHIPPED | OUTPATIENT
Start: 2024-06-10 | End: 2024-07-10

## 2024-06-10 ASSESSMENT — PATIENT HEALTH QUESTIONNAIRE - PHQ9
2. FEELING DOWN, DEPRESSED OR HOPELESS: NOT AT ALL
1. LITTLE INTEREST OR PLEASURE IN DOING THINGS: NOT AT ALL
SUM OF ALL RESPONSES TO PHQ9 QUESTIONS 1 AND 2: 0

## 2024-06-10 NOTE — PROGRESS NOTES
Patient is here 1 month follow-up medical weight loss.  He has done well with.  Also he stopped the Synthroid after about 3 weeks was getting really fatigued and downtrodden he is just on the Cytomel and feels great.  He also has a form for the [employer] to be filled out.  He also has been having some right knee pain on the middle.  No swelling locking or giving just feels stiff especially if he puts it in positions sitting.    REVIEW OF SYSTEMS: 12 systems negative except for those mentioned in the HPI.    PHYSICAL EXAMINATION:   Constitutional: The patient is alert, in no acute  distress.  Eyes: Extraocular movements are intact.   ENT: external ear canals patent  Neck: no  JVD.  Heart: no JVD  Lungs: Normal respiration without stridor or nasal flaring   Psychiatric: Good judgment and insight. Normal affect and mood.  Skin: no rashes or lesions  MSK/neurologic: Cranials 2 through 12 grossly intact.  Full range of motion of the right knee.  No edema.  Normal anterior posterior drawer sign.  Localized pain to the inferior right MCL      Assessment:  per EMR    Plan:  The MCL strain will put on meloxicam.  Also discussed vastus medialis strengthening.  Also went back through and looked in CBC CMP A1c TSH.  Patient will stay on the Cytomel and then hold off on the Synthroid since it was causing him symptoms we will recheck in 6 weeks and this order was placed.  Also OARRS is reviewed and appropriate patient can continue he had met the 5% likely will have 1 month of Adipex after this.  Can also do physical therapy if necessary for the knee forms filled out for the [employer] and scanned into the chart    This dictation was created using Dragon dictation and may contain errors

## 2024-07-15 ENCOUNTER — LAB (OUTPATIENT)
Dept: LAB | Facility: LAB | Age: 44
End: 2024-07-15
Payer: COMMERCIAL

## 2024-07-15 DIAGNOSIS — E03.9 ACQUIRED HYPOTHYROIDISM: Primary | ICD-10-CM

## 2024-07-15 DIAGNOSIS — E05.00 GRAVES DISEASE: ICD-10-CM

## 2024-07-15 DIAGNOSIS — E03.9 ACQUIRED HYPOTHYROIDISM: ICD-10-CM

## 2024-07-15 LAB
T4 FREE SERPL-MCNC: 0.67 NG/DL (ref 0.78–1.48)
TSH SERPL-ACNC: 18.16 MIU/L (ref 0.44–3.98)

## 2024-07-15 PROCEDURE — 84443 ASSAY THYROID STIM HORMONE: CPT

## 2024-07-15 PROCEDURE — 36415 COLL VENOUS BLD VENIPUNCTURE: CPT

## 2024-07-15 PROCEDURE — 84439 ASSAY OF FREE THYROXINE: CPT

## 2024-07-15 RX ORDER — LIOTHYRONINE SODIUM 25 UG/1
25 TABLET ORAL DAILY
Qty: 90 TABLET | Refills: 0 | Status: SHIPPED | OUTPATIENT
Start: 2024-07-15 | End: 2024-10-13

## 2024-07-15 RX ORDER — THYROID 30 MG/1
30 TABLET ORAL DAILY
Qty: 90 TABLET | Refills: 0 | Status: SHIPPED | OUTPATIENT
Start: 2024-07-15 | End: 2024-07-17 | Stop reason: WASHOUT

## 2024-07-17 ENCOUNTER — APPOINTMENT (OUTPATIENT)
Dept: PRIMARY CARE | Facility: CLINIC | Age: 44
End: 2024-07-17
Payer: COMMERCIAL

## 2024-07-17 VITALS
WEIGHT: 203 LBS | DIASTOLIC BLOOD PRESSURE: 66 MMHG | BODY MASS INDEX: 30.77 KG/M2 | HEIGHT: 68 IN | HEART RATE: 82 BPM | SYSTOLIC BLOOD PRESSURE: 92 MMHG | TEMPERATURE: 97.5 F

## 2024-07-17 DIAGNOSIS — E55.9 VITAMIN D DEFICIENCY: ICD-10-CM

## 2024-07-17 DIAGNOSIS — E03.9 ACQUIRED HYPOTHYROIDISM: Primary | ICD-10-CM

## 2024-07-17 DIAGNOSIS — E66.9 OBESITY (BMI 30-39.9): ICD-10-CM

## 2024-07-17 DIAGNOSIS — Z71.3 WEIGHT LOSS COUNSELING, ENCOUNTER FOR: ICD-10-CM

## 2024-07-17 PROCEDURE — 99214 OFFICE O/P EST MOD 30 MIN: CPT | Performed by: FAMILY MEDICINE

## 2024-07-17 PROCEDURE — 3008F BODY MASS INDEX DOCD: CPT | Performed by: FAMILY MEDICINE

## 2024-07-17 NOTE — PROGRESS NOTES
Patient is here 1 month follow-up.  He he has not lost any weight but still on the Adipex.  He still looks like he had a huge fluctuations in cravings since stopping the Synthroid.  And just, feels a little bit off.    REVIEW OF SYSTEMS: 12 systems negative except for those mentioned in the HPI.    PHYSICAL EXAMINATION:   Constitutional: The patient is alert, in no acute  distress.  Eyes: Extraocular movements are intact.   ENT: external ear canals patent  Neck: no  JVD.  Heart: no JVD  Lungs: Normal respiration without stridor or nasal flaring   Psychiatric: Good judgment and insight. Normal affect and mood.  Skin: no rashes or lesions    Assessment:  per EMR    Plan:  Reviewed CBC CMP A1c lipid panel thyroid obviously made a huge fluctuation since the patient stopped thyroid and he was actually for 1 slightly hyperthyroid but felt fatigued.  Since he is gone just to the [employer] I will have to go tomorrow to treatment dose.  Will go up to 10 mcg and then 15 mcg and then up to the 25 over the course of the next 2 weeks.  Patient longer qualifies for the Adipex as he is not lost 5% of he is down 12 pounds overall which is excellent.  Hopefully taking care of her thyroid and will address that.  I will recheck the TSH in 6 weeks time which is already placed in the blood work as well as also in antibodies  Tentatively follow-up in 3 months for both medical weight loss as well as also the thyroid.    This dictation was created using Dragon dictation and may contain errors

## 2024-07-23 ENCOUNTER — APPOINTMENT (OUTPATIENT)
Dept: PRIMARY CARE | Facility: CLINIC | Age: 44
End: 2024-07-23
Payer: COMMERCIAL

## 2024-10-16 ENCOUNTER — LAB (OUTPATIENT)
Dept: LAB | Facility: LAB | Age: 44
End: 2024-10-16
Payer: COMMERCIAL

## 2024-10-16 DIAGNOSIS — E03.9 ACQUIRED HYPOTHYROIDISM: ICD-10-CM

## 2024-10-16 LAB
T3FREE SERPL-MCNC: 3.4 PG/ML (ref 2.3–4.2)
T4 FREE SERPL-MCNC: 0.39 NG/DL (ref 0.78–1.48)
TSH SERPL-ACNC: 24.58 MIU/L (ref 0.44–3.98)

## 2024-10-16 PROCEDURE — 84439 ASSAY OF FREE THYROXINE: CPT

## 2024-10-16 PROCEDURE — 36415 COLL VENOUS BLD VENIPUNCTURE: CPT

## 2024-10-16 PROCEDURE — 84443 ASSAY THYROID STIM HORMONE: CPT

## 2024-10-16 PROCEDURE — 84481 FREE ASSAY (FT-3): CPT

## 2024-10-17 ENCOUNTER — APPOINTMENT (OUTPATIENT)
Dept: PRIMARY CARE | Facility: CLINIC | Age: 44
End: 2024-10-17
Payer: COMMERCIAL

## 2024-10-17 VITALS
WEIGHT: 210 LBS | DIASTOLIC BLOOD PRESSURE: 68 MMHG | HEART RATE: 67 BPM | BODY MASS INDEX: 31.83 KG/M2 | TEMPERATURE: 98.1 F | SYSTOLIC BLOOD PRESSURE: 108 MMHG | HEIGHT: 68 IN

## 2024-10-17 DIAGNOSIS — E55.9 VITAMIN D DEFICIENCY: ICD-10-CM

## 2024-10-17 DIAGNOSIS — Z71.3 WEIGHT LOSS COUNSELING, ENCOUNTER FOR: ICD-10-CM

## 2024-10-17 DIAGNOSIS — E03.9 ACQUIRED HYPOTHYROIDISM: Primary | ICD-10-CM

## 2024-10-17 DIAGNOSIS — E66.9 OBESITY (BMI 30-39.9): ICD-10-CM

## 2024-10-17 DIAGNOSIS — E05.00 GRAVES DISEASE: ICD-10-CM

## 2024-10-17 DIAGNOSIS — E05.00 GRAVES' DISEASE: ICD-10-CM

## 2024-10-17 PROCEDURE — 3008F BODY MASS INDEX DOCD: CPT | Performed by: FAMILY MEDICINE

## 2024-10-17 PROCEDURE — 99214 OFFICE O/P EST MOD 30 MIN: CPT | Performed by: FAMILY MEDICINE

## 2024-10-17 RX ORDER — LEVOTHYROXINE SODIUM 100 UG/1
100 TABLET ORAL DAILY
Qty: 90 TABLET | Refills: 0 | Status: SHIPPED | OUTPATIENT
Start: 2024-10-17 | End: 2025-01-15

## 2024-10-17 RX ORDER — LIOTHYRONINE SODIUM 50 UG/1
50 TABLET ORAL DAILY
Qty: 90 TABLET | Refills: 0 | Status: SHIPPED | OUTPATIENT
Start: 2024-10-17 | End: 2025-01-15

## 2024-10-17 RX ORDER — PHENTERMINE HYDROCHLORIDE 37.5 MG/1
37.5 TABLET ORAL
Qty: 30 TABLET | Refills: 0 | Status: SHIPPED | OUTPATIENT
Start: 2024-10-17 | End: 2024-11-16

## 2024-10-17 NOTE — PROGRESS NOTES
Patient is here for 3-month follow-up.  He would like to go back on Adipex for weight loss still feeling bad and cannot get in the endocrinology until February.  He originally was on the 150 mcg of Synthroid however caused him upset stomach and did not seem to stabilize him either    REVIEW OF SYSTEMS: 12 systems negative except for those mentioned in the HPI.    PHYSICAL EXAMINATION:   Constitutional: The patient is alert, in no acute  distress.  Eyes: Extraocular movements are intact.   ENT: external ear canals patent  Neck: no  JVD.  Heart: no JVD  Lungs: Normal respiration without stridor or nasal flaring   Psychiatric: Good judgment and insight. Normal affect and mood.  Skin: no rashes or lesions    Assessment:  per EMR    Plan:  OARRS reviewed appropriate.  Go back on Adipex.  Discussed will go back on's levothyroxine especially since his T4 stores are extremely low and he needs this buildups and some edibles only T3.  Will increase the T3 to 50 mcg as well since his TSH has risen so drastically.  Since he was originally on the 150 mcg we will have a cumulative dose combined with the 2.  Will then do a TSH in 1 month since he is having more active performance of the 6 weeks    This dictation was created using Dragon dictation and may contain errors

## 2024-11-14 ENCOUNTER — LAB (OUTPATIENT)
Dept: LAB | Facility: LAB | Age: 44
End: 2024-11-14
Payer: COMMERCIAL

## 2024-11-14 DIAGNOSIS — E03.9 ACQUIRED HYPOTHYROIDISM: ICD-10-CM

## 2024-11-14 PROCEDURE — 84443 ASSAY THYROID STIM HORMONE: CPT

## 2024-11-14 PROCEDURE — 84439 ASSAY OF FREE THYROXINE: CPT

## 2024-11-14 PROCEDURE — 36415 COLL VENOUS BLD VENIPUNCTURE: CPT

## 2024-11-15 LAB
T4 FREE SERPL-MCNC: 1.22 NG/DL (ref 0.78–1.48)
TSH SERPL-ACNC: 0.04 MIU/L (ref 0.44–3.98)

## 2024-11-18 ENCOUNTER — APPOINTMENT (OUTPATIENT)
Dept: PRIMARY CARE | Facility: CLINIC | Age: 44
End: 2024-11-18
Payer: COMMERCIAL

## 2024-11-18 VITALS
TEMPERATURE: 97.3 F | BODY MASS INDEX: 30.16 KG/M2 | HEART RATE: 91 BPM | WEIGHT: 199 LBS | HEIGHT: 68 IN | DIASTOLIC BLOOD PRESSURE: 78 MMHG | SYSTOLIC BLOOD PRESSURE: 124 MMHG

## 2024-11-18 DIAGNOSIS — M77.01 GOLFER'S ELBOW, RIGHT: ICD-10-CM

## 2024-11-18 DIAGNOSIS — E05.00 GRAVES DISEASE: ICD-10-CM

## 2024-11-18 DIAGNOSIS — E66.9 OBESITY (BMI 30-39.9): ICD-10-CM

## 2024-11-18 DIAGNOSIS — J04.0 LARYNGITIS: ICD-10-CM

## 2024-11-18 DIAGNOSIS — E03.9 ACQUIRED HYPOTHYROIDISM: ICD-10-CM

## 2024-11-18 DIAGNOSIS — M75.20 BICEPS TENDINITIS, UNSPECIFIED LATERALITY: ICD-10-CM

## 2024-11-18 DIAGNOSIS — Z71.3 WEIGHT LOSS COUNSELING, ENCOUNTER FOR: Primary | ICD-10-CM

## 2024-11-18 PROCEDURE — 99214 OFFICE O/P EST MOD 30 MIN: CPT | Performed by: FAMILY MEDICINE

## 2024-11-18 PROCEDURE — 3008F BODY MASS INDEX DOCD: CPT | Performed by: FAMILY MEDICINE

## 2024-11-18 RX ORDER — LIOTHYRONINE SODIUM 50 UG/1
50 TABLET ORAL DAILY
Qty: 90 TABLET | Refills: 1 | Status: SHIPPED | OUTPATIENT
Start: 2024-11-18 | End: 2025-05-17

## 2024-11-18 RX ORDER — PHENTERMINE HYDROCHLORIDE 37.5 MG/1
37.5 TABLET ORAL
Qty: 30 TABLET | Refills: 0 | Status: SHIPPED | OUTPATIENT
Start: 2024-11-18 | End: 2024-12-18

## 2024-11-18 RX ORDER — PREDNISONE 20 MG/1
TABLET ORAL
Qty: 18 TABLET | Refills: 0 | Status: SHIPPED | OUTPATIENT
Start: 2024-11-18

## 2024-11-18 RX ORDER — LEVOTHYROXINE SODIUM 100 UG/1
100 TABLET ORAL DAILY
Qty: 90 TABLET | Refills: 1 | Status: SHIPPED | OUTPATIENT
Start: 2024-11-18 | End: 2025-05-17

## 2024-11-18 NOTE — PROGRESS NOTES
Patient is here 1 month follow-up medical weight loss that she has done excellent is down 11 pounds.  Also follow-up with thyroid for which we added in the Synthroid does have follow-up with endocrinology but this seems to be working much better also increasing the T3.  Also he is lost his voice has been going on since last week originally had some sinus pressure that is gone away.  Also is having some biceps tendinitis as well as also pain in the bottom part of his elbow.  He was doing a lot of concrete normally if he rested for about a week it goes away but this time it is not.  He did do occupational therapy for something similar many years ago but is not been back recently and is very active.    REVIEW OF SYSTEMS: 12 systems negative except for those mentioned in the HPI.    PHYSICAL EXAMINATION:   Constitutional: The patient is alert, in no acute  distress.  Eyes: Extraocular movements are intact. Pupils are equal and reactive to light  ENT: Patient with whispery voice.  TMs are clear bilaterally as well as nares and posterior pharynx  Neck: Supple without lymphadenopathy or JVD.  Heart: Regular rate and rhythm without murmur, click, gallop, or rub.  Lungs: Clear to auscultation bilaterally. No rales, rhonchi, or  wheezing.  Psychiatric: Good judgment and insight. Normal affect and mood.  Lymphatic: as noted above.  Skin: no rashes or lesions  MSK/neurologic: Cranials 2 through 12 is intact.  Pain in the bicipital groove of the right arm as well as also the medial epicondyle.      Assessment:  per EMR    Plan:  OARRS reviewed appropriate patient is an excellent medical weight loss we will continue on Adipex follow-up in 1 month.  Reviewed the T3-T4 I will also go ahead and put in repeat TSH and T3 for January at the current doses.  Also continue weight loss and anti-inflammatory.  With the laryngitis as well as also the tendinitis will put on a prednisone taper.  Discussed occupational therapy follow-up to help  since he does have an active job and then will follow-up if worsening with a fever over 101 shortness of breath or wheezing lungs were clear today    This dictation was created using Dragon dictation and may contain errors

## 2024-11-29 DIAGNOSIS — N20.0 KIDNEY STONES: ICD-10-CM

## 2024-12-19 ENCOUNTER — APPOINTMENT (OUTPATIENT)
Dept: PRIMARY CARE | Facility: CLINIC | Age: 44
End: 2024-12-19
Payer: COMMERCIAL

## 2024-12-30 ENCOUNTER — APPOINTMENT (OUTPATIENT)
Dept: PRIMARY CARE | Facility: CLINIC | Age: 44
End: 2024-12-30
Payer: COMMERCIAL

## 2025-01-04 ENCOUNTER — LAB (OUTPATIENT)
Dept: LAB | Facility: LAB | Age: 45
End: 2025-01-04
Payer: COMMERCIAL

## 2025-01-04 DIAGNOSIS — E05.00 GRAVES DISEASE: ICD-10-CM

## 2025-01-04 PROCEDURE — 84439 ASSAY OF FREE THYROXINE: CPT

## 2025-01-04 PROCEDURE — 86800 THYROGLOBULIN ANTIBODY: CPT

## 2025-01-04 PROCEDURE — 86376 MICROSOMAL ANTIBODY EACH: CPT

## 2025-01-04 PROCEDURE — 84443 ASSAY THYROID STIM HORMONE: CPT

## 2025-01-04 PROCEDURE — 84481 FREE ASSAY (FT-3): CPT

## 2025-01-05 LAB
T3FREE SERPL-MCNC: 8.4 PG/ML (ref 2.3–4.2)
T4 FREE SERPL-MCNC: 1.03 NG/DL (ref 0.78–1.48)
THYROPEROXIDASE AB SERPL-ACNC: >1000 IU/ML
TSH SERPL-ACNC: 0.01 MIU/L (ref 0.44–3.98)

## 2025-01-06 LAB — THYROGLOB AB SERPL-ACNC: 1.6 IU/ML (ref 0–4)

## 2025-01-07 ENCOUNTER — APPOINTMENT (OUTPATIENT)
Dept: PRIMARY CARE | Facility: CLINIC | Age: 45
End: 2025-01-07
Payer: COMMERCIAL

## 2025-01-07 VITALS
HEIGHT: 68 IN | WEIGHT: 197 LBS | TEMPERATURE: 98.4 F | DIASTOLIC BLOOD PRESSURE: 68 MMHG | SYSTOLIC BLOOD PRESSURE: 110 MMHG | BODY MASS INDEX: 29.86 KG/M2 | HEART RATE: 89 BPM

## 2025-01-07 DIAGNOSIS — B37.9 YEAST INFECTION: ICD-10-CM

## 2025-01-07 DIAGNOSIS — E66.9 OBESITY (BMI 30-39.9): ICD-10-CM

## 2025-01-07 DIAGNOSIS — E03.9 ACQUIRED HYPOTHYROIDISM: ICD-10-CM

## 2025-01-07 DIAGNOSIS — E05.00 GRAVES' DISEASE: ICD-10-CM

## 2025-01-07 DIAGNOSIS — L98.9 SORE ON THIGH: ICD-10-CM

## 2025-01-07 DIAGNOSIS — Z71.3 WEIGHT LOSS COUNSELING, ENCOUNTER FOR: ICD-10-CM

## 2025-01-07 DIAGNOSIS — E05.00 GRAVES DISEASE: Primary | ICD-10-CM

## 2025-01-07 PROCEDURE — 3008F BODY MASS INDEX DOCD: CPT | Performed by: FAMILY MEDICINE

## 2025-01-07 PROCEDURE — 99214 OFFICE O/P EST MOD 30 MIN: CPT | Performed by: FAMILY MEDICINE

## 2025-01-07 RX ORDER — CLOTRIMAZOLE AND BETAMETHASONE DIPROPIONATE 10; .64 MG/G; MG/G
1 CREAM TOPICAL 2 TIMES DAILY
Qty: 45 G | Refills: 1 | Status: SHIPPED | OUTPATIENT
Start: 2025-01-07 | End: 2025-03-08

## 2025-01-07 RX ORDER — LEVOTHYROXINE SODIUM 100 UG/1
100 TABLET ORAL DAILY
Qty: 90 TABLET | Refills: 1 | Status: SHIPPED | OUTPATIENT
Start: 2025-01-07 | End: 2025-01-07 | Stop reason: SDUPTHER

## 2025-01-07 RX ORDER — LIOTHYRONINE SODIUM 50 UG/1
50 TABLET ORAL DAILY
Qty: 90 TABLET | Refills: 1 | Status: SHIPPED | OUTPATIENT
Start: 2025-01-07 | End: 2025-07-06

## 2025-01-07 RX ORDER — NYSTATIN 100000 [USP'U]/G
1 POWDER TOPICAL 2 TIMES DAILY
Qty: 60 G | Refills: 2 | Status: SHIPPED | OUTPATIENT
Start: 2025-01-07 | End: 2026-01-07

## 2025-01-07 RX ORDER — PHENTERMINE HYDROCHLORIDE 37.5 MG/1
37.5 TABLET ORAL
Qty: 30 TABLET | Refills: 0 | Status: SHIPPED | OUTPATIENT
Start: 2025-01-07 | End: 2025-02-06

## 2025-01-07 RX ORDER — LEVOTHYROXINE SODIUM 100 UG/1
100 TABLET ORAL DAILY
Qty: 90 TABLET | Refills: 1 | Status: SHIPPED | OUTPATIENT
Start: 2025-01-07 | End: 2025-07-06

## 2025-01-07 NOTE — PROGRESS NOTES
Patient is here for follow-up of medical weight loss.  He also he has had some lesions that had resolved with jock itch but seem to have come back.  Also to get the blood work CBC CMP thyroid wonder discussed that as well.    REVIEW OF SYSTEMS: 12 systems negative except for those mentioned in the HPI.    PHYSICAL EXAMINATION:   Constitutional: The patient is alert, in no acute  distress.  Eyes: Extraocular movements are intact.   ENT: external ear canals patent  Neck: no  JVD.  Heart: no JVD  Lungs: Normal respiration without stridor or nasal flaring   Psychiatric: Good judgment and insight. Normal affect and mood.  Skin: Darkened rash with mild erythema opening on the right inner thigh    Assessment:  per EMR    Plan:  OARRS reviewed appropriate patient has done well with the weight loss we will continue.  I discussed the hyperthyroidism patient will continue the current dosing wishes to stay stable for right now I reviewed labs as noted above.  Will stay at the current thyroid dose.  Will continue on the Adipex follow-up in a month    This dictation was created using Dragon dictation and may contain errors

## 2025-01-22 NOTE — PROGRESS NOTES
HPI    44 y.o. male being seen with the following problem list:    Problem list:  Stones     Was in ED 2/27/24 with R flank pain. CT showed a 1.2mm R UVJ stone. Bladder empty. Mild R hydro. Small nonobstructing R kidney stone.      He passed a stone yesterday.  Brings in for analysis.  Previous stone past year prior was calcium oxalate.  His for stone was 1 year ago , he passed spontaneously.     Stone composition - ca ox     Litholink 3/2024 - notable for low urine volumes, hypocitraturia, pH 6.2, elevated dietary sodium.     05/30/24 - seen back to review LL. Recommendations:  Increase fluids, goal >2.5L UOP/day  Litholyte 1 packet 2x a day to increase citrate  Reduce dietary sodium    01/23/25 - He passed a stone on thanksgiving, easily, brings the stone with him today. Mild right flank pain. He has been increasing his citrus intake. Not using litholyte. Notes 3 stones in last 2 years, would like to repeat metabolic testing.       Current Medications:  Current Outpatient Medications   Medication Sig Dispense Refill    cholecalciferol (Vitamin D3) 25 MCG (1000 UT) capsule Take 1 capsule (25 mcg) by mouth once daily. PRN      clotrimazole-betamethasone (Lotrisone) cream Apply 1 Application topically 2 times a day. 45 g 1    levothyroxine (Synthroid, Levoxyl) 100 mcg tablet Take 1 tablet (100 mcg) by mouth early in the morning.. Take on an empty stomach at the same time each day, either 30 to 60 minutes prior to breakfast 90 tablet 1    liothyronine (Cytomel) 50 mcg tablet Take 1 tablet (50 mcg) by mouth once daily. 90 tablet 1    naproxen (Naprosyn) 250 mg tablet Take 1 tablet (250 mg) by mouth 2 times daily (morning and late afternoon). PRN      nystatin (Mycostatin) 100,000 unit/gram powder Apply 1 Application topically 2 times a day. 60 g 2    phentermine (Adipex-P) 37.5 mg tablet Take 1 tablet (37.5 mg) by mouth once daily in the morning. Take before meals. 30 tablet 0     No current facility-administered  medications for this visit.        Active Problems:  Kedar Moore is a 44 y.o. male with the following Problems and Medications.  Patient Active Problem List   Diagnosis    Acute pain of right shoulder    Rotator cuff injury, right, sequela    Biceps tendonitis on right    Eczema    GERD (gastroesophageal reflux disease)    Graves disease    History of Graves' disease    Hyperlipidemia    Hypothyroidism    Increased thyroid stimulating hormone level    Obesity (BMI 30-39.9)    Sleep apnea    Thrombocytopenia (CMS-[facility])    Vitamin D deficiency    Lumbar sprain    Weight loss counseling, encounter for    Dog bite    Pain of right shoulder region    Biceps tendinitis    Gastroesophageal reflux disease    Graves' disease    High thyroid hormone level    Injury of right rotator cuff    Low platelet count (CMS-HCC)    Calcium kidney stone    Calculus of ureter    Hypocitraturia    Mcl deficiency, knee, right    Golfer's elbow, right    Laryngitis    Sore on thigh     Current Outpatient Medications   Medication Sig Dispense Refill    cholecalciferol (Vitamin D3) 25 MCG (1000 UT) capsule Take 1 capsule (25 mcg) by mouth once daily. PRN      clotrimazole-betamethasone (Lotrisone) cream Apply 1 Application topically 2 times a day. 45 g 1    levothyroxine (Synthroid, Levoxyl) 100 mcg tablet Take 1 tablet (100 mcg) by mouth early in the morning.. Take on an empty stomach at the same time each day, either 30 to 60 minutes prior to breakfast 90 tablet 1    liothyronine (Cytomel) 50 mcg tablet Take 1 tablet (50 mcg) by mouth once daily. 90 tablet 1    naproxen (Naprosyn) 250 mg tablet Take 1 tablet (250 mg) by mouth 2 times daily (morning and late afternoon). PRN      nystatin (Mycostatin) 100,000 unit/gram powder Apply 1 Application topically 2 times a day. 60 g 2    phentermine (Adipex-P) 37.5 mg tablet Take 1 tablet (37.5 mg) by mouth once daily in the morning. Take before meals. 30 tablet 0     No current  facility-administered medications for this visit.       PMH:  No past medical history on file.    PSH:  Past Surgical History:   Procedure Laterality Date    OTHER SURGICAL HISTORY  05/18/2022    Sinus surgery    OTHER SURGICAL HISTORY  05/18/2022    Complete colonoscopy       FMH:  Family History   Problem Relation Name Age of Onset    Thyroid disease Mother      Diverticulosis Father      Heart block Father         SHx:  Social History     Tobacco Use    Smoking status: Never    Smokeless tobacco: Current     Types: Chew   Vaping Use    Vaping status: Never Used   Substance Use Topics    Alcohol use: Not Currently    Drug use: Never       Allergies:  Allergies   Allergen Reactions    Pollen Extracts Unknown         Assessment/Plan  Passed another stone few months ago, still with mild right flank pain. I recommend doing a litholink as well a CT stone protocol. Discussed that litholyte is more effective than citrus fruits at raising citrate, as the amount of citrus fruit to match a packet of litholyte is fairly significant. Will do litholyte 2 packets a day.     Follow up in 6 weeks over TH.       [employer]  By signing my name below, I, Radha Davis, attest that this documentation has been prepared under the direction and in the presence of Jordy Yo MD.

## 2025-01-23 ENCOUNTER — OFFICE VISIT (OUTPATIENT)
Dept: UROLOGY | Facility: HOSPITAL | Age: 45
End: 2025-01-23
Payer: COMMERCIAL

## 2025-01-23 DIAGNOSIS — N20.0 CALCIUM KIDNEY STONE: Primary | ICD-10-CM

## 2025-01-23 DIAGNOSIS — R10.9 RIGHT FLANK PAIN: ICD-10-CM

## 2025-01-23 PROCEDURE — 99214 OFFICE O/P EST MOD 30 MIN: CPT | Performed by: UROLOGY

## 2025-01-23 PROCEDURE — G2211 COMPLEX E/M VISIT ADD ON: HCPCS | Performed by: UROLOGY

## 2025-02-07 ENCOUNTER — APPOINTMENT (OUTPATIENT)
Dept: PRIMARY CARE | Facility: CLINIC | Age: 45
End: 2025-02-07
Payer: COMMERCIAL

## 2025-02-07 VITALS
SYSTOLIC BLOOD PRESSURE: 114 MMHG | WEIGHT: 194 LBS | BODY MASS INDEX: 29.4 KG/M2 | TEMPERATURE: 97.9 F | DIASTOLIC BLOOD PRESSURE: 75 MMHG | HEIGHT: 68 IN | HEART RATE: 83 BPM

## 2025-02-07 DIAGNOSIS — E78.2 MIXED HYPERLIPIDEMIA: ICD-10-CM

## 2025-02-07 DIAGNOSIS — E66.3 OVERWEIGHT (BMI 25.0-29.9): ICD-10-CM

## 2025-02-07 DIAGNOSIS — Z71.3 WEIGHT LOSS COUNSELING, ENCOUNTER FOR: Primary | ICD-10-CM

## 2025-02-07 DIAGNOSIS — G47.33 OBSTRUCTIVE SLEEP APNEA SYNDROME: ICD-10-CM

## 2025-02-07 DIAGNOSIS — N20.0 CALCIUM KIDNEY STONE: ICD-10-CM

## 2025-02-07 PROCEDURE — 99214 OFFICE O/P EST MOD 30 MIN: CPT | Performed by: FAMILY MEDICINE

## 2025-02-07 PROCEDURE — 3008F BODY MASS INDEX DOCD: CPT | Performed by: FAMILY MEDICINE

## 2025-02-07 RX ORDER — PHENTERMINE HYDROCHLORIDE 37.5 MG/1
37.5 TABLET ORAL
Qty: 30 TABLET | Refills: 0 | Status: SHIPPED | OUTPATIENT
Start: 2025-02-07 | End: 2025-03-09

## 2025-02-07 NOTE — PROGRESS NOTES
The patient is here for follow-up of medical weight loss.  He also has an endocrinologist visit coming.  Also he followed up with urologist this is a powder to help increase the citrate and is urine.  Patient is net 5% and also has MALIA.    REVIEW OF SYSTEMS: 12 systems negative except for those mentioned in the HPI.    PHYSICAL EXAMINATION:   Constitutional: The patient is alert, in no acute  distress.  Eyes: Extraocular movements are intact.   ENT: external ear canals patent  Neck: no  JVD.  Heart: no JVD  Lungs: Normal respiration without stridor or nasal flaring   Psychiatric: Good judgment and insight. Normal affect and mood.  Skin: no rashes or lesions    Assessment:  per EMR    Plan:  OARRS reviewed appropriate.  Will continue is not the 5% and also has 2 comorbid conditions that will benefit from weight loss.  Follow-up with endocrinology I will be very helpful.  Also reviewed last TSH also reviewed urology visit will follow-up in 1 month needs to be down to 188    This dictation was created using Dragon dictation and may contain errors   68 y/o F with T2DM uncontrolled on insulin, osteoporosis, HTN, here with acute respiratory failure 2/2 influenza s/p PEA arrest x 2, on month long tx for aspiration pna. BG values at goal on present insulin regimen. Would hold overnight NPH while NPO to prevent hypoglycemia. BG goal (100-180mg/dl).

## 2025-02-10 ENCOUNTER — HOSPITAL ENCOUNTER (OUTPATIENT)
Dept: RADIOLOGY | Facility: CLINIC | Age: 45
Discharge: HOME | End: 2025-02-10
Payer: COMMERCIAL

## 2025-02-10 DIAGNOSIS — N20.0 CALCIUM KIDNEY STONE: ICD-10-CM

## 2025-02-10 PROCEDURE — 74176 CT ABD & PELVIS W/O CONTRAST: CPT | Performed by: RADIOLOGY

## 2025-02-10 PROCEDURE — 74176 CT ABD & PELVIS W/O CONTRAST: CPT

## 2025-02-16 NOTE — PROGRESS NOTES
Endocrinology  02/17/25    Chief Complaint:   PCP: Craft   Used to see endo at [facility]   Dx graves disease: 2004   [facility] thyroid: grandfather   Current regimen:   Levothyroxine 100 mcg every day; takes correctly   Additional: cytomel generic 50mcg every day     History of Present Illness   Kedar Moore is a 44 y.o. year old male with medical history of Grave's Disease in remission now w/ hypothyroidism, and obesity now treated with adipex (managed by PCP_ , here for acquired hypothyroidism.    Patient previously followed with [facility] Endocrinology and has more recently been following with PCP for thyroid care.      Per [facility] notes, the patient was diagnosed with Graves Disease in 2004 after presenting w/ tachycardia, diaphoresis, and irritability. Thyroid antibodies were positive. He had a thyroid uptake and scan 7/7/2004 with the following report:   History: Goiter   Thyroid uptake and scan were done with 228 microcuries of iodine 123.   Six hour thyroid uptake measurement is 45.3 percent per 24 hours is 54.6 percent. These values are abnormally high. Images of the thyroid showed generalized enlargement and prominence of uptake with symmetry between the lobes. No hot or cold nodule is seen.   Impression: Prominent thyroid with increased uptake may be due to Graves disease.     He was treated with methimazole for many years, and then went into remission. He developed hypothyroidism in 2020 and was started on thyroid replacement therapy. The discussion of whether to do JUNG was had with the patient but he declined and chose to do the methimazole.    He is currently on both T3 and T4 replacement.    Was initially just treated with levothyroxine, and liothyronine was added when PCP assumed the patient's care. The patient feels no better on the T3 than he did on T4 monotherapy alone.     Fatigue: endorses but is improving as thyroid function is not hypothyroid  Weakness: denies  Weight gain: denies, losing weight on adipex (lost  "30-35 lbs over the year)  Constipation/Diarrhea: constipation  Cold/Heat Intolerance: denies  Dry skin: occasional  Hair loss: denies  Edema: denies  Myalgias: occasional  Parasthesias: denies  Palpitations: denies  Tremor: denies    Current regimen: 100 mcg LT4 and 50 mcg T3 daily  Ca/Mg/Fe/biotin: denies    Has noticed a high resting heart rate, running in the 90s at rest.     Review of Systems   Positive sxs are in BOLD    General: Denies fever or chills   Head: Denies headache (occasional sinus HA) or vision changes   Neck: Denies tenderness or lumps   Cardiac: Denies chest pain   Respiratory: Denies shortness of breath or cough   GI: Denies abdominal pain, nausea, or vomiting   Extremities: Denies swelling   Skin: Denies rash     Medications     Current Outpatient Medications   Medication Instructions    cholecalciferol (VITAMIN D3) 25 mcg, Daily    levothyroxine (SYNTHROID, LEVOXYL) 100 mcg, oral, Daily, Take on an empty stomach at the same time each day, either 30 to 60 minutes prior to breakfast    liothyronine (CYTOMEL) 50 mcg, oral, Daily    naproxen (NAPROSYN) 250 mg, 2 times daily (morning and late afternoon)    nystatin (Mycostatin) 100,000 unit/gram powder 1 Application, Topical, 2 times daily    phentermine (ADIPEX-P) 37.5 mg, oral, Daily before breakfast          History   No past medical history on file.    Past Surgical History:   Procedure Laterality Date    OTHER SURGICAL HISTORY  05/18/2022    Sinus surgery    OTHER SURGICAL HISTORY  05/18/2022    Complete colonoscopy       Family History   Problem Relation Name Age of Onset    Thyroid disease Mother      Diverticulosis Father      Heart block Father          Allergies   Allergen Reactions    Pollen Extracts Unknown        Social history: chews tobacco (1 can every 3 days), occasional etoh, denies illicits       Physical Exam   /64   Ht 1.753 m (5' 9\")   Wt 91.6 kg (202 lb)   BMI 29.83 kg/m²   Constitutional: He is well-developed, " well-nourished, and in no distress.  No hyperactivity observed, no rapid speech.   Head: Normocephalic, Atrautmatic  Mouth/Throat: Oropharynx is clear and moist  Eyes: No lid retraction (stare), no proptosis, no lid lag. Non-icteric.   Neck: Normal range of motion. Neck supple. No thyromegaly present. No nodules palpated.  Cardiovascular: Normal rate, regular rhythm, normal heart sounds and intact distal pulses.   Pulmonary/Chest: Effort normal and breath sounds normal. No respiratory distress.  Musculoskeletal: normal muscle mass, normal muscle tone  Neurological: He is alert. He is not disoriented.  No focal deficits.    DTR's normal.  No hyperreflexia observed.   Skin: Skin is warm and moist.   Psychiatric: Appropriate mood and affect        Labs and Imaging      Latest Reference Range & Units 03/12/20 14:12 03/16/24 11:29 05/03/24 16:41 06/05/24 12:30 07/15/24 10:33 10/16/24 08:34 11/14/24 14:55 01/04/25 10:09   Thyroxine, Free 0.78 - 1.48 ng/dL   0.76 (L)  0.67 (L) 0.39 (L) 1.22 1.03   Thyroid Stimulating Hormone 0.44 - 3.98 mIU/L 17.65 (H) 29.91 (H) 31.20 (H) 0.09 (L) 18.16 (H) 24.58 (H) 0.04 (L) 0.01 (L)   (L): Data is abnormally low  (H): Data is abnormally high      Assessment and Plan   Kedar Moore is a 44 y.o. year old male with medical history of Grave's Disease in remission now w/ hypothyroidism, obesity, here for acquired hypothyroidism.    #Acquired hypothyroidism  - patient with hx of Grave's Disease now in remission and with acquired hypothyroidism  - current medications: 100 mcg levothyroxine and 50 mcg liothyronine  - 1/2025 with iatrogenic hyperthyroidism, no dose change after that time per patient.  - Will repeat labs now, I anticipate they will be similar to January labs but want to confirm no significant difference that would impact plan.   - Patient is on adipex and complaining of a high resting heart rate. Will discontinue T3 supplementation as it is likely contributing.  - Weight based  levothyroxine dose is 150 mcg daily. Given current suppression of TSH, will start with an average daily dose of 128 mcg (9 tabs of 100 mcg weekly). We reviewed sxs consistent with overt hypothyroidism, and if he develops them he is to let me know via MyChart  - Repeat labs in 6 weeks for ongoing dose adjustment.      RTC: 4M with virtual           Thuy Price MD   of Medicine  Department of Internal Medicine  [facility]  [facility]

## 2025-02-17 ENCOUNTER — APPOINTMENT (OUTPATIENT)
Dept: ENDOCRINOLOGY | Facility: CLINIC | Age: 45
End: 2025-02-17
Payer: COMMERCIAL

## 2025-02-17 VITALS
BODY MASS INDEX: 29.92 KG/M2 | WEIGHT: 202 LBS | HEIGHT: 69 IN | SYSTOLIC BLOOD PRESSURE: 118 MMHG | DIASTOLIC BLOOD PRESSURE: 64 MMHG

## 2025-02-17 DIAGNOSIS — E03.9 ACQUIRED HYPOTHYROIDISM: Primary | ICD-10-CM

## 2025-02-17 PROCEDURE — 3008F BODY MASS INDEX DOCD: CPT | Performed by: STUDENT IN AN ORGANIZED HEALTH CARE EDUCATION/TRAINING PROGRAM

## 2025-02-17 PROCEDURE — 99204 OFFICE O/P NEW MOD 45 MIN: CPT | Performed by: STUDENT IN AN ORGANIZED HEALTH CARE EDUCATION/TRAINING PROGRAM

## 2025-02-17 PROCEDURE — G2211 COMPLEX E/M VISIT ADD ON: HCPCS | Performed by: STUDENT IN AN ORGANIZED HEALTH CARE EDUCATION/TRAINING PROGRAM

## 2025-02-17 NOTE — PATIENT INSTRUCTIONS
After Visit Summary  It was great seeing you today!    In summary from our visit today, I would like to remind you of the following:    - STOP liothyronine (cytomel)  - START taking 2 pills of 100 mcg levothyroxine on Mondays and Tuesdays. Wednesday through Sunday take 1 pill of 100 mcg levothyroxine  - Levothyroxine should be administered on an empty stomach at least 30-60 minutes before food/drinks and other medications, and 4 hours apart from any calcium/magnesium/iron supplements. If you are taking a proton pump inhibitor such as omeprazole or pantoprazole, this can affect the absorption of levothyroxine, and therefore the dose that is appropriate for you.  - Please get labs in 6 weeks.     Division of Endocrinology   Follow-up appointments:  Please arrive at least 20 minutes prior to your follow up appointments in order to keep visits as close as possible to the scheduled times. If you need to cancel an appointment, please call at least 24 hours in advance.    Please bring your medications or an updated list of medications to each of your visits to help ensure safety in prescribing future medications.    If you are being seen for diabetes, please bring your glucose meter and/or glucose log with 2 weeks of glucose readings to each visit.    Medication Refills: Please request medication refills at the time of your appointment.   - Refills requested by phone or [employer] in between visits require at least 3 business days to be processed.    Lab Results: Please allow at least 7 business days for lab results to be reviewed.  - Please note, that some specialty testing may take up to at least 7 business to be completed.   - If there are any urgent issues requiring immediate attention, we will contact you at your provided phone numbers.   - Any non-urgent results will be relayed via [employer] if you have signed up for this service or via a letter through the mail and/or phone call.     Communication with your provider:  -  [facility] MP[employer] Mobilehart is highly recommended as the most efficient means to contact your provider. However for urgent concerns please call.   - For phone calls, please call our office Monday- Friday 8am to 4:30pm and your message will be relayed to your provider. Please allows 1-2 business days for a response.  - After hours messages are left with an answering service and will be handled by the clinic the following business day.      Sincerely,   Thuy Price MD  Division of Endocrinology  [facility]

## 2025-02-18 ENCOUNTER — APPOINTMENT (OUTPATIENT)
Dept: ENDOCRINOLOGY | Facility: CLINIC | Age: 45
End: 2025-02-18
Payer: COMMERCIAL

## 2025-02-18 LAB
T4 FREE SERPL-MCNC: 1.1 NG/DL (ref 0.8–1.8)
TSH SERPL-ACNC: <0.01 MIU/L (ref 0.4–4.5)

## 2025-03-05 NOTE — PROGRESS NOTES
HPI    44 y.o. male being seen with the following problem list:    Problem list:  Stones     Was in ED 2/27/24 with R flank pain. CT showed a 1.2mm R UVJ stone. Bladder empty. Mild R hydro. Small nonobstructing R kidney stone.      He passed a stone yesterday.  Brings in for analysis.  Previous stone past year prior was calcium oxalate.  His for stone was 1 year ago , he passed spontaneously.     Stone composition - ca ox     Litholink 3/2024 - notable for low urine volumes, hypocitraturia, pH 6.2, elevated dietary sodium.     05/30/24 - seen back to review LL. Recommendations:  Increase fluids, goal >2.5L UOP/day  Litholyte 1 packet 2x a day to increase citrate  Reduce dietary sodium     01/23/25 - He passed a stone on thanksgiving, easily, brings the stone with him today. Mild right flank pain. He has been increasing his citrus intake. Not using litholyte. Notes 3 stones in last 2 years, would like to repeat metabolic testing.    2/10/25 CT abd/pel personally reviewed  -Nonobstructive tiny stones. No hydronephrosis bilaterally. No signs of obstructive uropathy.    Litholink notable for: elevated PH of 7, some dietary sodium abuse, normal super saturation Ca phosphate, Ca OX and normal super Sat of UA.    03/06/25 - Seen today over telehealth, performed this visit using real-time telehealth tools, including an audio/video connection between Kedar Moore at home and Jordy Yo MD at [facility]. Consent for telemedicine visit was obtained.   Doing well overall, completed his litholink, results reviewed       Current Medications:  Current Outpatient Medications   Medication Sig Dispense Refill    cholecalciferol (Vitamin D3) 25 MCG (1000 UT) capsule Take 1 capsule (25 mcg) by mouth once daily. PRN      levothyroxine (Synthroid, Levoxyl) 100 mcg tablet Take 1 tablet (100 mcg) by mouth early in the morning.. Take on an empty stomach at the same time each day, either 30 to 60 minutes prior to breakfast  90 tablet 1    naproxen (Naprosyn) 250 mg tablet Take 1 tablet (250 mg) by mouth 2 times daily (morning and late afternoon). PRN      nystatin (Mycostatin) 100,000 unit/gram powder Apply 1 Application topically 2 times a day. 60 g 2    phentermine (Adipex-P) 37.5 mg tablet Take 1 tablet (37.5 mg) by mouth once daily in the morning. Take before meals. 30 tablet 0     No current facility-administered medications for this visit.        Active Problems:  Kedar Moore is a 44 y.o. male with the following Problems and Medications.  Patient Active Problem List   Diagnosis    Acute pain of right shoulder    Rotator cuff injury, right, sequela    Biceps tendonitis on right    Eczema    GERD (gastroesophageal reflux disease)    Graves disease    History of Graves' disease    Hyperlipidemia    Hypothyroidism    Increased thyroid stimulating hormone level    Obesity (BMI 30-39.9)    Sleep apnea    Thrombocytopenia (CMS-[facility])    Vitamin D deficiency    Lumbar sprain    Weight loss counseling, encounter for    Dog bite    Pain of right shoulder region    Biceps tendinitis    Gastroesophageal reflux disease    Graves' disease    High thyroid hormone level    Injury of right rotator cuff    Low platelet count (CMS-HCC)    Calcium kidney stone    Calculus of ureter    Hypocitraturia    Mcl deficiency, knee, right    Golfer's elbow, right    Laryngitis    Sore on thigh    Overweight (BMI 25.0-29.9)     Current Outpatient Medications   Medication Sig Dispense Refill    cholecalciferol (Vitamin D3) 25 MCG (1000 UT) capsule Take 1 capsule (25 mcg) by mouth once daily. PRN      levothyroxine (Synthroid, Levoxyl) 100 mcg tablet Take 1 tablet (100 mcg) by mouth early in the morning.. Take on an empty stomach at the same time each day, either 30 to 60 minutes prior to breakfast 90 tablet 1    naproxen (Naprosyn) 250 mg tablet Take 1 tablet (250 mg) by mouth 2 times daily (morning and late afternoon). PRN      nystatin (Mycostatin)  100,000 unit/gram powder Apply 1 Application topically 2 times a day. 60 g 2    phentermine (Adipex-P) 37.5 mg tablet Take 1 tablet (37.5 mg) by mouth once daily in the morning. Take before meals. 30 tablet 0     No current facility-administered medications for this visit.       PMH:  No past medical history on file.    PSH:  Past Surgical History:   Procedure Laterality Date    OTHER SURGICAL HISTORY  05/18/2022    Sinus surgery    OTHER SURGICAL HISTORY  05/18/2022    Complete colonoscopy       FMH:  Family History   Problem Relation Name Age of Onset    Thyroid disease Mother      Diverticulosis Father      Heart block Father         SHx:  Social History     Tobacco Use    Smoking status: Never    Smokeless tobacco: Current     Types: Chew   Vaping Use    Vaping status: Never Used   Substance Use Topics    Alcohol use: Not Currently    Drug use: Never       Allergies:  Allergies   Allergen Reactions    Pollen Extracts Unknown         Assessment/Plan  Doing well symptomatically, reviewed his CT showing tiny non obstructing stones that do not require any surgical intervention. Completed his lithilonk but results are unavailable, will try to obtain them and reach out to him later during the day.     Reviewed his litholinks result showing mildly elevated PH, advise to continue litholyte, increase hydration and reduce sodium.      FU in 1 year with renal US.   Scribe Attestation  By signing my name below, I, Radha Davis, attest that this documentation has been prepared under the direction and in the presence of Jordy Yo MD.

## 2025-03-06 ENCOUNTER — TELEMEDICINE (OUTPATIENT)
Dept: UROLOGY | Facility: HOSPITAL | Age: 45
End: 2025-03-06
Payer: COMMERCIAL

## 2025-03-06 DIAGNOSIS — N20.0 CALCIUM KIDNEY STONE: ICD-10-CM

## 2025-03-06 DIAGNOSIS — R82.991 HYPOCITRATURIA: Primary | ICD-10-CM

## 2025-03-06 PROCEDURE — 99213 OFFICE O/P EST LOW 20 MIN: CPT | Performed by: UROLOGY

## 2025-03-06 PROCEDURE — G2211 COMPLEX E/M VISIT ADD ON: HCPCS | Performed by: UROLOGY

## 2025-03-11 ENCOUNTER — APPOINTMENT (OUTPATIENT)
Dept: PRIMARY CARE | Facility: CLINIC | Age: 45
End: 2025-03-11
Payer: COMMERCIAL

## 2025-03-11 VITALS
TEMPERATURE: 97.7 F | HEART RATE: 79 BPM | HEIGHT: 69 IN | DIASTOLIC BLOOD PRESSURE: 62 MMHG | WEIGHT: 192 LBS | BODY MASS INDEX: 28.44 KG/M2 | SYSTOLIC BLOOD PRESSURE: 102 MMHG

## 2025-03-11 DIAGNOSIS — E66.3 OVERWEIGHT (BMI 25.0-29.9): ICD-10-CM

## 2025-03-11 DIAGNOSIS — Z71.3 WEIGHT LOSS COUNSELING, ENCOUNTER FOR: Primary | ICD-10-CM

## 2025-03-11 DIAGNOSIS — E78.2 MIXED HYPERLIPIDEMIA: ICD-10-CM

## 2025-03-11 DIAGNOSIS — E03.9 ACQUIRED HYPOTHYROIDISM: ICD-10-CM

## 2025-03-11 DIAGNOSIS — E05.00 GRAVES' DISEASE: ICD-10-CM

## 2025-03-11 PROCEDURE — 99213 OFFICE O/P EST LOW 20 MIN: CPT | Performed by: FAMILY MEDICINE

## 2025-03-11 PROCEDURE — 3008F BODY MASS INDEX DOCD: CPT | Performed by: FAMILY MEDICINE

## 2025-03-11 RX ORDER — PHENTERMINE HYDROCHLORIDE 37.5 MG/1
37.5 TABLET ORAL
Qty: 30 TABLET | Refills: 0 | Status: SHIPPED | OUTPATIENT
Start: 2025-03-11 | End: 2025-04-10

## 2025-03-11 NOTE — PROGRESS NOTES
Patient is here for follow-up of medical weight loss as well as also forms for [employer] as well as also thyroid.  He did see endocrinologist who took him off the submitted all in simply to come to 9 pills/week of the Synthroid.  I still little bit hyperthyroid but otherwise is felt well    REVIEW OF SYSTEMS: 12 systems negative except for those mentioned in the HPI.    PHYSICAL EXAMINATION:   Constitutional: The patient is alert, in no acute  distress.  Eyes: Extraocular movements are intact.   ENT: external ear canals patent  Neck: no  JVD.  Heart: no JVD  Lungs: Normal respiration without stridor or nasal flaring   Psychiatric: Good judgment and insight. Normal affect and mood.  Skin: no rashes or lesions    Assessment:  per EMR    Plan:  OARRS reviewed appropriate.  He is to comorbid conditions that would improve with weight loss.  Follow-up in 1 month for misfiled out skin in the chart    This dictation was created using Dragon dictation and may contain errors

## 2025-04-11 ENCOUNTER — APPOINTMENT (OUTPATIENT)
Dept: PRIMARY CARE | Facility: CLINIC | Age: 45
End: 2025-04-11
Payer: COMMERCIAL

## 2025-04-17 ENCOUNTER — APPOINTMENT (OUTPATIENT)
Dept: PRIMARY CARE | Facility: CLINIC | Age: 45
End: 2025-04-17
Payer: COMMERCIAL

## 2025-04-17 VITALS
DIASTOLIC BLOOD PRESSURE: 84 MMHG | WEIGHT: 200 LBS | SYSTOLIC BLOOD PRESSURE: 134 MMHG | HEART RATE: 86 BPM | BODY MASS INDEX: 29.62 KG/M2 | TEMPERATURE: 98.2 F | HEIGHT: 69 IN

## 2025-04-17 DIAGNOSIS — Z71.3 WEIGHT LOSS COUNSELING, ENCOUNTER FOR: ICD-10-CM

## 2025-04-17 DIAGNOSIS — J01.00 ACUTE NON-RECURRENT MAXILLARY SINUSITIS: Primary | ICD-10-CM

## 2025-04-17 DIAGNOSIS — E03.9 ACQUIRED HYPOTHYROIDISM: ICD-10-CM

## 2025-04-17 DIAGNOSIS — I73.00 RAYNAUD'S DISEASE WITHOUT GANGRENE: ICD-10-CM

## 2025-04-17 PROCEDURE — 99214 OFFICE O/P EST MOD 30 MIN: CPT | Performed by: FAMILY MEDICINE

## 2025-04-17 PROCEDURE — 3008F BODY MASS INDEX DOCD: CPT | Performed by: FAMILY MEDICINE

## 2025-04-17 RX ORDER — PREDNISONE 50 MG/1
50 TABLET ORAL DAILY
Qty: 5 TABLET | Refills: 0 | Status: SHIPPED | OUTPATIENT
Start: 2025-04-17 | End: 2025-04-22

## 2025-04-17 RX ORDER — AMLODIPINE BESYLATE 2.5 MG/1
2.5 TABLET ORAL DAILY
Qty: 30 TABLET | Refills: 5 | Status: SHIPPED | OUTPATIENT
Start: 2025-04-17 | End: 2025-04-17 | Stop reason: SDUPTHER

## 2025-04-17 RX ORDER — AMOXICILLIN AND CLAVULANATE POTASSIUM 875; 125 MG/1; MG/1
875 TABLET, FILM COATED ORAL 2 TIMES DAILY
Qty: 20 TABLET | Refills: 0 | Status: SHIPPED | OUTPATIENT
Start: 2025-04-17 | End: 2025-04-27

## 2025-04-17 RX ORDER — AMLODIPINE BESYLATE 2.5 MG/1
2.5 TABLET ORAL NIGHTLY
Qty: 30 TABLET | Refills: 5 | Status: SHIPPED | OUTPATIENT
Start: 2025-04-17 | End: 2025-10-14

## 2025-04-17 NOTE — PROGRESS NOTES
Patient is here for follow-up of medical weight loss.  He had a sinus infection in the send rapid nuclear grade since the weekend.  Also he gets very cold hands to the point where he cannot hold a wrench.  He was in 70 degrees in [place] last week and it was perfectly fine came back here he is having painful discoloration of his fingers and his toes worse in his right hand where he had a dog bite.    REVIEW OF SYSTEMS: 12 systems negative except for those mentioned in the HPI.    PHYSICAL EXAMINATION:   Constitutional: The patient is alert, in no acute  distress.  Eyes: Extraocular movements are intact. Pupils are equal and reactive to light  ENT: Fluid bulging TMs bilaterally boggy nasal turbinates left worse than right as well as pain in V1 V2  Neck: Supple without lymphadenopathy or JVD.  Heart: Regular rate and rhythm without murmur, click, gallop, or rub.  Lungs: Clear to auscultation bilaterally. No rales, rhonchi, or  wheezing.  Psychiatric: Good judgment and insight. Normal affect and mood.  Lymphatic: as noted above.  Skin: Cold whitish discolored fingers bilaterally    Assessment:  per EMR    Plan:  OARRS reviewed appropriate.  Patient no longer qualifies for the Adipex.  Will go on Augmentin and steroid for the sinus infection.  Go on amlodipine at bedtime for the Raynaud's and call back next week.  And follow-up in 2 months    This dictation was created using Dragon dictation and may contain errors

## 2025-06-05 DIAGNOSIS — E03.9 ACQUIRED HYPOTHYROIDISM: Primary | ICD-10-CM

## 2025-06-05 LAB
T4 FREE SERPL-MCNC: 2.4 NG/DL (ref 0.8–1.8)
TSH SERPL-ACNC: 0.05 MIU/L (ref 0.4–4.5)

## 2025-06-08 NOTE — PROGRESS NOTES
Endocrinology  06/07/25    Chief Complaint:   PCP: [facility]   Used to see endo at [facility]   Dx graves disease: 2004   [facility] thyroid: grandfather   Current regimen:   Levothyroxine 100 mcg every day; takes correctly   Additional: cytomel generic 50mcg every day     History of Present Illness   Kedar Moore is a 45 y.o. year old male with medical history of Grave's Disease in remission now w/ hypothyroidism, and obesity now treated with adipex (managed by PCP), here for acquired hypothyroidism.    Interim hx:  Patient recently repeated thyroid function and had an elevated FT4 and a suppressed TSH.     Palpitations have improved.    Appetite changes: increased since stopping adipex  Weight loss: denies, gained 15 lbs  Heat Intolerance: denies  Palpitations: resolved  Tremor: denies  Diaphoresis: denies  Agitation/Irritability/Anxiety: endorses   Weakness: denies  Insomnia: endorses, chronic    Current TRT: 100 mcg (9 tabs per wk)  Biotin/Ca/Mg/Fe/PPI: denies  Takes LT4 in the morning and doesn't eat until a couple hours afterward       HPI:  Patient previously followed with [facility] and has more recently been following with PCP for thyroid care.      Per F notes, the patient was diagnosed with Graves Disease in 2004 after presenting w/ tachycardia, diaphoresis, and irritability. Thyroid antibodies were positive. He had a thyroid uptake and scan 7/7/2004 with the following report:   History: Goiter   Thyroid uptake and scan were done with 228 microcuries of iodine 123.   Six hour thyroid uptake measurement is 45.3 percent per 24 hours is 54.6 percent. These values are abnormally high. Images of the thyroid showed generalized enlargement and prominence of uptake with symmetry between the lobes. No hot or cold nodule is seen.   Impression: Prominent thyroid with increased uptake may be due to Graves disease.     He was treated with methimazole for many years, and then went into remission. He developed hypothyroidism  in 2020 and was started on thyroid replacement therapy. The discussion of whether to do JUNG was had with the patient but he declined and chose to do the methimazole.    He is currently on both T3 and T4 replacement.    Was initially just treated with levothyroxine, and liothyronine was added when PCP assumed the patient's care. The patient feels no better on the T3 than he did on T4 monotherapy alone.     Fatigue: endorses but is improving as thyroid function is not hypothyroid  Weakness: denies  Weight gain: denies, losing weight on adipex (lost 30-35 lbs over the year)  Constipation/Diarrhea: constipation  Cold/Heat Intolerance: denies  Dry skin: occasional  Hair loss: denies  Edema: denies  Myalgias: occasional  Parasthesias: denies  Palpitations: denies  Tremor: denies    Current regimen: 100 mcg LT4 and 50 mcg T3 daily  Ca/Mg/Fe/biotin: denies    Has noticed a high resting heart rate, running in the 90s at rest.     Review of Systems   Positive sxs are in BOLD    General: Denies fever or chills   Head: Denies headache (sinus HA) or vision changes   Neck: Denies tenderness or lumps   Cardiac: Denies chest pain   Respiratory: Denies shortness of breath or cough   GI: Denies abdominal pain, nausea, or vomiting   Extremities: Denies swelling   Skin: Denies rash     Medications     Current Outpatient Medications   Medication Instructions    amLODIPine (NORVASC) 2.5 mg, oral, Nightly    cholecalciferol (VITAMIN D3) 25 mcg, Daily    levothyroxine (SYNTHROID, LEVOXYL) 100 mcg, oral, Daily, Take on an empty stomach at the same time each day, either 30 to 60 minutes prior to breakfast    naproxen (NAPROSYN) 250 mg, 2 times daily (morning and late afternoon)    nystatin (Mycostatin) 100,000 unit/gram powder 1 Application, Topical, 2 times daily    phentermine (ADIPEX-P) 37.5 mg, oral, Daily before breakfast          History     Past Medical History:   Diagnosis Date    Kidney stone 2/27/2024, last year       Past Surgical  History:   Procedure Laterality Date    OTHER SURGICAL HISTORY  05/18/2022    Sinus surgery    OTHER SURGICAL HISTORY  05/18/2022    Complete colonoscopy       Family History   Problem Relation Name Age of Onset    Thyroid disease Mother      Diverticulosis Father      Heart block Father          Allergies   Allergen Reactions    Pollen Extracts Unknown        Social history: chews tobacco (1 can every 3 days), occasional etoh, denies illicits       Physical Exam   No VS d/t virtual    Brief physical exam  General: sitting up in bed in NAD  Head and Neck: NCAT  Eyes: conjunctiva not injected, anicteric  Pulm: normal WOB, no respiratory distress  Neuro: awake, alert, oriented  Psych: mood appropriate for clinical setting       Labs and Imaging      Latest Reference Range & Units 06/04/25 14:57   T4, FREE 0.8 - 1.8 ng/dL 2.4 (H)   TSH 0.40 - 4.50 mIU/L 0.05 (L)   (H): Data is abnormally high  (L): Data is abnormally low     Latest Reference Range & Units 03/12/20 14:12 03/16/24 11:29 05/03/24 16:41 06/05/24 12:30 07/15/24 10:33 10/16/24 08:34 11/14/24 14:55 01/04/25 10:09   Thyroxine, Free 0.78 - 1.48 ng/dL   0.76 (L)  0.67 (L) 0.39 (L) 1.22 1.03   Thyroid Stimulating Hormone 0.44 - 3.98 mIU/L 17.65 (H) 29.91 (H) 31.20 (H) 0.09 (L) 18.16 (H) 24.58 (H) 0.04 (L) 0.01 (L)   (L): Data is abnormally low  (H): Data is abnormally high      Assessment and Plan   Kedar Moore is a 45 y.o. year old male with medical history of Grave's Disease in remission now w/ hypothyroidism, obesity, here for acquired hypothyroidism.    #Acquired hypothyroidism  - patient with hx of Grave's Disease now in remission and with acquired hypothyroidism  - current medications: 100 mcg levothyroxine 9 tabs per week  - decrease from 9 tabs/wk to 8 tabs/wk (avg daily dose 128 mcg to 114 mcg daily)        RTC: 6M         Thuy Price MD   of Medicine  Department of Medicine  Diabetes and Metabolic Care [facility]  [facility]

## 2025-06-10 ENCOUNTER — TELEMEDICINE (OUTPATIENT)
Age: 45
End: 2025-06-10
Payer: COMMERCIAL

## 2025-06-10 VITALS — WEIGHT: 205 LBS | HEIGHT: 68 IN | BODY MASS INDEX: 31.07 KG/M2

## 2025-06-10 DIAGNOSIS — E03.9 ACQUIRED HYPOTHYROIDISM: Primary | ICD-10-CM

## 2025-06-10 PROCEDURE — 3008F BODY MASS INDEX DOCD: CPT | Performed by: STUDENT IN AN ORGANIZED HEALTH CARE EDUCATION/TRAINING PROGRAM

## 2025-06-10 PROCEDURE — 99214 OFFICE O/P EST MOD 30 MIN: CPT | Performed by: STUDENT IN AN ORGANIZED HEALTH CARE EDUCATION/TRAINING PROGRAM

## 2025-06-10 ASSESSMENT — ENCOUNTER SYMPTOMS
DEPRESSION: 0
OCCASIONAL FEELINGS OF UNSTEADINESS: 0
LOSS OF SENSATION IN FEET: 0

## 2025-06-19 ENCOUNTER — APPOINTMENT (OUTPATIENT)
Dept: PRIMARY CARE | Facility: CLINIC | Age: 45
End: 2025-06-19
Payer: COMMERCIAL

## 2025-06-19 VITALS
WEIGHT: 211 LBS | DIASTOLIC BLOOD PRESSURE: 74 MMHG | SYSTOLIC BLOOD PRESSURE: 134 MMHG | BODY MASS INDEX: 33.12 KG/M2 | TEMPERATURE: 98 F | HEART RATE: 73 BPM | HEIGHT: 67 IN

## 2025-06-19 DIAGNOSIS — E03.9 ACQUIRED HYPOTHYROIDISM: ICD-10-CM

## 2025-06-19 DIAGNOSIS — I73.00 RAYNAUD'S DISEASE WITHOUT GANGRENE: ICD-10-CM

## 2025-06-19 DIAGNOSIS — E55.9 VITAMIN D DEFICIENCY: ICD-10-CM

## 2025-06-19 DIAGNOSIS — Z71.3 WEIGHT LOSS COUNSELING, ENCOUNTER FOR: ICD-10-CM

## 2025-06-19 DIAGNOSIS — E78.2 MIXED HYPERLIPIDEMIA: ICD-10-CM

## 2025-06-19 DIAGNOSIS — E66.9 OBESITY (BMI 30-39.9): ICD-10-CM

## 2025-06-19 DIAGNOSIS — Z00.00 WELLNESS EXAMINATION: Primary | ICD-10-CM

## 2025-06-19 PROCEDURE — 99396 PREV VISIT EST AGE 40-64: CPT | Performed by: FAMILY MEDICINE

## 2025-06-19 PROCEDURE — 3008F BODY MASS INDEX DOCD: CPT | Performed by: FAMILY MEDICINE

## 2025-06-19 RX ORDER — PHENTERMINE HYDROCHLORIDE 37.5 MG/1
37.5 TABLET ORAL
Qty: 30 TABLET | Refills: 0 | Status: SHIPPED | OUTPATIENT
Start: 2025-06-19 | End: 2025-07-19

## 2025-06-19 RX ORDER — LEVOTHYROXINE SODIUM 100 UG/1
TABLET ORAL
Qty: 100 TABLET | Refills: 1 | Status: SHIPPED | OUTPATIENT
Start: 2025-06-19

## 2025-06-19 RX ORDER — AMLODIPINE BESYLATE 2.5 MG/1
2.5 TABLET ORAL NIGHTLY
Qty: 90 TABLET | Refills: 1 | Status: SHIPPED | OUTPATIENT
Start: 2025-06-19 | End: 2025-12-16

## 2025-06-19 NOTE — PROGRESS NOTES
Patient is here for annual wellness.  Has had some adjustment in thyroid down to 8 pills is otherwise doing excellent would like to back on weight loss medication.  Did have a colonoscopy in 2016    REVIEW OF SYSTEMS: 12 systems negative except for those mentioned in the HPI. Reviewed home risks with patient. Patient feels safe at home.    PHYSICAL EXAMINATION:   Constitutional: The patient is alert and oriented x3, in no acute  distress.  Eyes: Extraocular movements are intact. Pupils are equal and reactive to light  ENT: Bilateral TMs within normal limits. Nasal turbinates are within normal limits. Throat within normal limits.  Neck: Supple without lymphadenopathy or JVD.  Heart: Regular rate and rhythm without murmur, click, gallop, or rub.  Lungs: Clear to auscultation bilaterally. No rales, rhonchi, or  wheezing.  Abdomen: Soft, nontender, nondistended. Normoactive bowel sounds.   No palpable masses. Normal percussion  Musculoskeletal: 5/5 motor, upper and lower extremities.  Neurologic: Cranial nerves II through XII fully intact. +2/4 DTRs  in ankle and knee.  Psychiatric: Good judgment and insight. Normal affect and mood. No cognitive defects noted.  Lymphatic: Negative as noted above.  Skin: no rashes or lesions    Assessment:  Per EMR    Plan:  Patient will have annual blood work.  Due for colonoscopy next year.  OARRS reviewed appropriate will go back on Adipex and follow-up in a month  Discussed with the patient and reviewed annual wellness questionnaire form as well as cognitive deficits. Also discussed with the patient immunizations and risks for colonoscopy and other screening procedures    This dictation was created using Dragon dictation and may contain errors

## 2025-07-18 ENCOUNTER — APPOINTMENT (OUTPATIENT)
Dept: PRIMARY CARE | Facility: CLINIC | Age: 45
End: 2025-07-18
Payer: COMMERCIAL

## 2025-07-18 VITALS
BODY MASS INDEX: 31.55 KG/M2 | HEIGHT: 67 IN | DIASTOLIC BLOOD PRESSURE: 88 MMHG | TEMPERATURE: 97.9 F | SYSTOLIC BLOOD PRESSURE: 124 MMHG | WEIGHT: 201 LBS | HEART RATE: 93 BPM

## 2025-07-18 DIAGNOSIS — Z71.3 WEIGHT LOSS COUNSELING, ENCOUNTER FOR: Primary | ICD-10-CM

## 2025-07-18 DIAGNOSIS — E66.9 OBESITY (BMI 30-39.9): ICD-10-CM

## 2025-07-18 LAB
T4 FREE SERPL-MCNC: 1.9 NG/DL (ref 0.8–1.8)
TSH SERPL-ACNC: 0.01 MIU/L (ref 0.4–4.5)

## 2025-07-18 PROCEDURE — 99213 OFFICE O/P EST LOW 20 MIN: CPT | Performed by: FAMILY MEDICINE

## 2025-07-18 PROCEDURE — 3008F BODY MASS INDEX DOCD: CPT | Performed by: FAMILY MEDICINE

## 2025-07-18 RX ORDER — PHENTERMINE HYDROCHLORIDE 37.5 MG/1
37.5 TABLET ORAL
Qty: 30 TABLET | Refills: 0 | Status: SHIPPED | OUTPATIENT
Start: 2025-07-18 | End: 2025-08-17

## 2025-07-18 NOTE — PROGRESS NOTES
patient is here 1 month follow medical weight loss no issues from replanted down 10 pounds.    REVIEW OF SYSTEMS: 12 systems negative except for those mentioned in the HPI.    PHYSICAL EXAMINATION:   Constitutional: The patient is alert, in no acute  distress.  Eyes: Extraocular movements are intact.   ENT: external ear canals patent  Neck: no  JVD.  Heart: no JVD  Lungs: Normal respiration without stridor or nasal flaring   Psychiatric: Good judgment and insight. Normal affect and mood.  Skin: no rashes or lesions    Assessment:  per EMR    Plan:  OARRS reviewed appropriate.  Patient has done excellent will follow-up in a month    This dictation was created using Dragon dictation and may contain errors

## 2025-07-19 LAB
25(OH)D3+25(OH)D2 SERPL-MCNC: 30 NG/ML (ref 30–100)
ALBUMIN SERPL-MCNC: 4.3 G/DL (ref 3.6–5.1)
ALP SERPL-CCNC: 58 U/L (ref 36–130)
ALT SERPL-CCNC: 16 U/L (ref 9–46)
ANION GAP SERPL CALCULATED.4IONS-SCNC: 8 MMOL/L (CALC) (ref 7–17)
AST SERPL-CCNC: 16 U/L (ref 10–40)
BASOPHILS # BLD AUTO: 21 CELLS/UL (ref 0–200)
BASOPHILS NFR BLD AUTO: 0.3 %
BILIRUB SERPL-MCNC: 0.5 MG/DL (ref 0.2–1.2)
BUN SERPL-MCNC: 16 MG/DL (ref 7–25)
CALCIUM SERPL-MCNC: 9.2 MG/DL (ref 8.6–10.3)
CHLORIDE SERPL-SCNC: 103 MMOL/L (ref 98–110)
CHOLEST SERPL-MCNC: 189 MG/DL
CHOLEST/HDLC SERPL: 2.9 (CALC)
CO2 SERPL-SCNC: 28 MMOL/L (ref 20–32)
CREAT SERPL-MCNC: 1.2 MG/DL (ref 0.6–1.29)
EGFRCR SERPLBLD CKD-EPI 2021: 76 ML/MIN/1.73M2
EOSINOPHIL # BLD AUTO: 119 CELLS/UL (ref 15–500)
EOSINOPHIL NFR BLD AUTO: 1.7 %
ERYTHROCYTE [DISTWIDTH] IN BLOOD BY AUTOMATED COUNT: 12.8 % (ref 11–15)
EST. AVERAGE GLUCOSE BLD GHB EST-MCNC: 111 MG/DL
EST. AVERAGE GLUCOSE BLD GHB EST-SCNC: 6.2 MMOL/L
GLUCOSE SERPL-MCNC: 87 MG/DL (ref 65–99)
HBA1C MFR BLD: 5.5 %
HCT VFR BLD AUTO: 44.6 % (ref 38.5–50)
HDLC SERPL-MCNC: 65 MG/DL
HGB BLD-MCNC: 14.5 G/DL (ref 13.2–17.1)
LDLC SERPL CALC-MCNC: 105 MG/DL (CALC)
LYMPHOCYTES # BLD AUTO: 1659 CELLS/UL (ref 850–3900)
LYMPHOCYTES NFR BLD AUTO: 23.7 %
MCH RBC QN AUTO: 29.1 PG (ref 27–33)
MCHC RBC AUTO-ENTMCNC: 32.5 G/DL (ref 32–36)
MCV RBC AUTO: 89.6 FL (ref 80–100)
MONOCYTES # BLD AUTO: 763 CELLS/UL (ref 200–950)
MONOCYTES NFR BLD AUTO: 10.9 %
NEUTROPHILS # BLD AUTO: 4438 CELLS/UL (ref 1500–7800)
NEUTROPHILS NFR BLD AUTO: 63.4 %
NONHDLC SERPL-MCNC: 124 MG/DL (CALC)
PLATELET # BLD AUTO: 190 THOUSAND/UL (ref 140–400)
PMV BLD REES-ECKER: 11.8 FL (ref 7.5–12.5)
POTASSIUM SERPL-SCNC: 4.6 MMOL/L (ref 3.5–5.3)
PROT SERPL-MCNC: 7.4 G/DL (ref 6.1–8.1)
RBC # BLD AUTO: 4.98 MILLION/UL (ref 4.2–5.8)
SODIUM SERPL-SCNC: 139 MMOL/L (ref 135–146)
TRIGL SERPL-MCNC: 92 MG/DL
WBC # BLD AUTO: 7 THOUSAND/UL (ref 3.8–10.8)

## 2025-08-12 ENCOUNTER — APPOINTMENT (OUTPATIENT)
Dept: PRIMARY CARE | Facility: CLINIC | Age: 45
End: 2025-08-12
Payer: COMMERCIAL

## 2025-08-12 VITALS
BODY MASS INDEX: 31.39 KG/M2 | TEMPERATURE: 98.2 F | WEIGHT: 200 LBS | HEART RATE: 82 BPM | DIASTOLIC BLOOD PRESSURE: 84 MMHG | HEIGHT: 67 IN | SYSTOLIC BLOOD PRESSURE: 131 MMHG

## 2025-08-12 DIAGNOSIS — E66.9 OBESITY (BMI 30-39.9): Primary | ICD-10-CM

## 2025-08-12 DIAGNOSIS — Z71.3 WEIGHT LOSS COUNSELING, ENCOUNTER FOR: ICD-10-CM

## 2025-08-12 DIAGNOSIS — E05.00 GRAVES' DISEASE: ICD-10-CM

## 2025-08-12 DIAGNOSIS — S46.001D INJURY OF RIGHT ROTATOR CUFF, SUBSEQUENT ENCOUNTER: ICD-10-CM

## 2025-08-12 PROCEDURE — 99214 OFFICE O/P EST MOD 30 MIN: CPT | Performed by: FAMILY MEDICINE

## 2025-08-12 PROCEDURE — 3008F BODY MASS INDEX DOCD: CPT | Performed by: FAMILY MEDICINE

## 2025-08-12 PROCEDURE — 20610 DRAIN/INJ JOINT/BURSA W/O US: CPT | Performed by: FAMILY MEDICINE

## 2025-08-12 RX ORDER — METHYLPREDNISOLONE ACETATE 80 MG/ML
80 INJECTION, SUSPENSION INTRA-ARTICULAR; INTRALESIONAL; INTRAMUSCULAR; SOFT TISSUE
Status: COMPLETED | OUTPATIENT
Start: 2025-08-12 | End: 2025-08-12

## 2025-08-12 RX ORDER — PHENTERMINE HYDROCHLORIDE 37.5 MG/1
37.5 TABLET ORAL
Qty: 30 TABLET | Refills: 0 | Status: SHIPPED | OUTPATIENT
Start: 2025-08-12 | End: 2025-09-11

## 2025-08-12 RX ORDER — TRIAMCINOLONE ACETONIDE 40 MG/ML
20 INJECTION, SUSPENSION INTRA-ARTICULAR; INTRAMUSCULAR
Status: COMPLETED | OUTPATIENT
Start: 2025-08-12 | End: 2025-08-12

## 2025-08-12 RX ORDER — BUPIVACAINE HYDROCHLORIDE 5 MG/ML
1 INJECTION, SOLUTION EPIDURAL; INTRACAUDAL; PERINEURAL
Status: COMPLETED | OUTPATIENT
Start: 2025-08-12 | End: 2025-08-12

## 2025-08-12 RX ADMIN — METHYLPREDNISOLONE ACETATE 80 MG: 80 INJECTION, SUSPENSION INTRA-ARTICULAR; INTRALESIONAL; INTRAMUSCULAR; SOFT TISSUE at 11:01

## 2025-08-12 RX ADMIN — TRIAMCINOLONE ACETONIDE 20 MG: 40 INJECTION, SUSPENSION INTRA-ARTICULAR; INTRAMUSCULAR at 11:01

## 2025-08-12 RX ADMIN — BUPIVACAINE HYDROCHLORIDE 1 ML: 5 INJECTION, SOLUTION EPIDURAL; INTRACAUDAL; PERINEURAL at 11:01

## 2025-09-12 ENCOUNTER — APPOINTMENT (OUTPATIENT)
Dept: PRIMARY CARE | Facility: CLINIC | Age: 45
End: 2025-09-12
Payer: COMMERCIAL

## 2026-02-16 ENCOUNTER — APPOINTMENT (OUTPATIENT)
Dept: ENDOCRINOLOGY | Facility: CLINIC | Age: 46
End: 2026-02-16
Payer: COMMERCIAL